# Patient Record
Sex: MALE | Race: OTHER | NOT HISPANIC OR LATINO | URBAN - METROPOLITAN AREA
[De-identification: names, ages, dates, MRNs, and addresses within clinical notes are randomized per-mention and may not be internally consistent; named-entity substitution may affect disease eponyms.]

---

## 2021-02-23 ENCOUNTER — INPATIENT (INPATIENT)
Facility: HOSPITAL | Age: 86
LOS: 8 days | Discharge: NURSING, REHAB OR LONG-TERM CARE | End: 2021-03-04
Attending: FAMILY MEDICINE | Admitting: FAMILY MEDICINE
Payer: MEDICARE

## 2021-02-23 VITALS
RESPIRATION RATE: 17 BRPM | TEMPERATURE: 98 F | WEIGHT: 154.98 LBS | HEIGHT: 67 IN | OXYGEN SATURATION: 98 % | SYSTOLIC BLOOD PRESSURE: 175 MMHG | HEART RATE: 69 BPM | DIASTOLIC BLOOD PRESSURE: 74 MMHG

## 2021-02-23 LAB
ALBUMIN SERPL ELPH-MCNC: 3.6 G/DL — SIGNIFICANT CHANGE UP (ref 3.5–5.2)
ALP SERPL-CCNC: 98 U/L — SIGNIFICANT CHANGE UP (ref 30–115)
ALT FLD-CCNC: 35 U/L — SIGNIFICANT CHANGE UP (ref 0–41)
ANION GAP SERPL CALC-SCNC: 15 MMOL/L — HIGH (ref 7–14)
APPEARANCE UR: ABNORMAL
AST SERPL-CCNC: 36 U/L — SIGNIFICANT CHANGE UP (ref 0–41)
BACTERIA # UR AUTO: ABNORMAL
BASOPHILS # BLD AUTO: 0.03 K/UL — SIGNIFICANT CHANGE UP (ref 0–0.2)
BASOPHILS NFR BLD AUTO: 0.2 % — SIGNIFICANT CHANGE UP (ref 0–1)
BILIRUB SERPL-MCNC: 0.4 MG/DL — SIGNIFICANT CHANGE UP (ref 0.2–1.2)
BILIRUB UR-MCNC: NEGATIVE — SIGNIFICANT CHANGE UP
BUN SERPL-MCNC: 85 MG/DL — CRITICAL HIGH (ref 10–20)
CALCIUM SERPL-MCNC: 8.6 MG/DL — SIGNIFICANT CHANGE UP (ref 8.5–10.1)
CHLORIDE SERPL-SCNC: 100 MMOL/L — SIGNIFICANT CHANGE UP (ref 98–110)
CO2 SERPL-SCNC: 27 MMOL/L — SIGNIFICANT CHANGE UP (ref 17–32)
COLOR SPEC: YELLOW — SIGNIFICANT CHANGE UP
CREAT SERPL-MCNC: 2.3 MG/DL — HIGH (ref 0.7–1.5)
DIFF PNL FLD: ABNORMAL
EOSINOPHIL # BLD AUTO: 0.03 K/UL — SIGNIFICANT CHANGE UP (ref 0–0.7)
EOSINOPHIL NFR BLD AUTO: 0.2 % — SIGNIFICANT CHANGE UP (ref 0–8)
EPI CELLS # UR: 0 /HPF — SIGNIFICANT CHANGE UP (ref 0–5)
GLUCOSE SERPL-MCNC: 115 MG/DL — HIGH (ref 70–99)
GLUCOSE UR QL: NEGATIVE — SIGNIFICANT CHANGE UP
HCT VFR BLD CALC: 38.5 % — LOW (ref 42–52)
HGB BLD-MCNC: 12.6 G/DL — LOW (ref 14–18)
HYALINE CASTS # UR AUTO: 2 /LPF — SIGNIFICANT CHANGE UP (ref 0–7)
IMM GRANULOCYTES NFR BLD AUTO: 1.2 % — HIGH (ref 0.1–0.3)
KETONES UR-MCNC: NEGATIVE — SIGNIFICANT CHANGE UP
LACTATE SERPL-SCNC: 1.1 MMOL/L — SIGNIFICANT CHANGE UP (ref 0.7–2)
LEUKOCYTE ESTERASE UR-ACNC: ABNORMAL
LYMPHOCYTES # BLD AUTO: 1.38 K/UL — SIGNIFICANT CHANGE UP (ref 1.2–3.4)
LYMPHOCYTES # BLD AUTO: 8.5 % — LOW (ref 20.5–51.1)
MCHC RBC-ENTMCNC: 31.1 PG — HIGH (ref 27–31)
MCHC RBC-ENTMCNC: 32.7 G/DL — SIGNIFICANT CHANGE UP (ref 32–37)
MCV RBC AUTO: 95.1 FL — HIGH (ref 80–94)
MONOCYTES # BLD AUTO: 0.17 K/UL — SIGNIFICANT CHANGE UP (ref 0.1–0.6)
MONOCYTES NFR BLD AUTO: 1 % — LOW (ref 1.7–9.3)
NEUTROPHILS # BLD AUTO: 14.45 K/UL — HIGH (ref 1.4–6.5)
NEUTROPHILS NFR BLD AUTO: 88.9 % — HIGH (ref 42.2–75.2)
NITRITE UR-MCNC: NEGATIVE — SIGNIFICANT CHANGE UP
NRBC # BLD: 0 /100 WBCS — SIGNIFICANT CHANGE UP (ref 0–0)
PH UR: 6 — SIGNIFICANT CHANGE UP (ref 5–8)
PLATELET # BLD AUTO: 218 K/UL — SIGNIFICANT CHANGE UP (ref 130–400)
POTASSIUM SERPL-MCNC: 3.8 MMOL/L — SIGNIFICANT CHANGE UP (ref 3.5–5)
POTASSIUM SERPL-SCNC: 3.8 MMOL/L — SIGNIFICANT CHANGE UP (ref 3.5–5)
PROT SERPL-MCNC: 7.2 G/DL — SIGNIFICANT CHANGE UP (ref 6–8)
PROT UR-MCNC: ABNORMAL
RAPID RVP RESULT: SIGNIFICANT CHANGE UP
RBC # BLD: 4.05 M/UL — LOW (ref 4.7–6.1)
RBC # FLD: 12 % — SIGNIFICANT CHANGE UP (ref 11.5–14.5)
RBC CASTS # UR COMP ASSIST: 114 /HPF — HIGH (ref 0–4)
SARS-COV-2 RNA SPEC QL NAA+PROBE: SIGNIFICANT CHANGE UP
SODIUM SERPL-SCNC: 142 MMOL/L — SIGNIFICANT CHANGE UP (ref 135–146)
SP GR SPEC: 1.02 — SIGNIFICANT CHANGE UP (ref 1.01–1.03)
UROBILINOGEN FLD QL: SIGNIFICANT CHANGE UP
WBC # BLD: 16.25 K/UL — HIGH (ref 4.8–10.8)
WBC # FLD AUTO: 16.25 K/UL — HIGH (ref 4.8–10.8)
WBC UR QL: 104 /HPF — HIGH (ref 0–5)

## 2021-02-23 PROCEDURE — 71045 X-RAY EXAM CHEST 1 VIEW: CPT | Mod: 26

## 2021-02-23 PROCEDURE — 76770 US EXAM ABDO BACK WALL COMP: CPT | Mod: 26

## 2021-02-23 PROCEDURE — 99285 EMERGENCY DEPT VISIT HI MDM: CPT

## 2021-02-23 PROCEDURE — 93010 ELECTROCARDIOGRAM REPORT: CPT

## 2021-02-23 RX ORDER — HEPARIN SODIUM 5000 [USP'U]/ML
5000 INJECTION INTRAVENOUS; SUBCUTANEOUS EVERY 8 HOURS
Refills: 0 | Status: DISCONTINUED | OUTPATIENT
Start: 2021-02-23 | End: 2021-03-04

## 2021-02-23 RX ORDER — FINASTERIDE 5 MG/1
1 TABLET, FILM COATED ORAL
Qty: 0 | Refills: 0 | DISCHARGE

## 2021-02-23 RX ORDER — CEFTRIAXONE 500 MG/1
1000 INJECTION, POWDER, FOR SOLUTION INTRAMUSCULAR; INTRAVENOUS EVERY 24 HOURS
Refills: 0 | Status: DISCONTINUED | OUTPATIENT
Start: 2021-02-23 | End: 2021-02-24

## 2021-02-23 RX ORDER — TAMSULOSIN HYDROCHLORIDE 0.4 MG/1
0.4 CAPSULE ORAL AT BEDTIME
Refills: 0 | Status: DISCONTINUED | OUTPATIENT
Start: 2021-02-23 | End: 2021-03-04

## 2021-02-23 RX ORDER — CEFTRIAXONE 500 MG/1
1000 INJECTION, POWDER, FOR SOLUTION INTRAMUSCULAR; INTRAVENOUS ONCE
Refills: 0 | Status: COMPLETED | OUTPATIENT
Start: 2021-02-23 | End: 2021-02-23

## 2021-02-23 RX ORDER — SODIUM CHLORIDE 9 MG/ML
1000 INJECTION, SOLUTION INTRAVENOUS ONCE
Refills: 0 | Status: COMPLETED | OUTPATIENT
Start: 2021-02-23 | End: 2021-02-23

## 2021-02-23 RX ORDER — SODIUM CHLORIDE 9 MG/ML
1000 INJECTION, SOLUTION INTRAVENOUS
Refills: 0 | Status: DISCONTINUED | OUTPATIENT
Start: 2021-02-23 | End: 2021-02-26

## 2021-02-23 RX ORDER — CHLORHEXIDINE GLUCONATE 213 G/1000ML
1 SOLUTION TOPICAL
Refills: 0 | Status: DISCONTINUED | OUTPATIENT
Start: 2021-02-23 | End: 2021-03-04

## 2021-02-23 RX ORDER — FINASTERIDE 5 MG/1
5 TABLET, FILM COATED ORAL DAILY
Refills: 0 | Status: DISCONTINUED | OUTPATIENT
Start: 2021-02-23 | End: 2021-03-04

## 2021-02-23 RX ORDER — ACETAMINOPHEN 500 MG
650 TABLET ORAL EVERY 6 HOURS
Refills: 0 | Status: DISCONTINUED | OUTPATIENT
Start: 2021-02-23 | End: 2021-03-04

## 2021-02-23 RX ADMIN — SODIUM CHLORIDE 75 MILLILITER(S): 9 INJECTION, SOLUTION INTRAVENOUS at 16:28

## 2021-02-23 RX ADMIN — FINASTERIDE 5 MILLIGRAM(S): 5 TABLET, FILM COATED ORAL at 16:27

## 2021-02-23 RX ADMIN — TAMSULOSIN HYDROCHLORIDE 0.4 MILLIGRAM(S): 0.4 CAPSULE ORAL at 21:41

## 2021-02-23 RX ADMIN — CEFTRIAXONE 1000 MILLIGRAM(S): 500 INJECTION, POWDER, FOR SOLUTION INTRAMUSCULAR; INTRAVENOUS at 12:52

## 2021-02-23 RX ADMIN — SODIUM CHLORIDE 75 MILLILITER(S): 9 INJECTION, SOLUTION INTRAVENOUS at 21:52

## 2021-02-23 RX ADMIN — CEFTRIAXONE 100 MILLIGRAM(S): 500 INJECTION, POWDER, FOR SOLUTION INTRAMUSCULAR; INTRAVENOUS at 12:51

## 2021-02-23 RX ADMIN — SODIUM CHLORIDE 1000 MILLILITER(S): 9 INJECTION, SOLUTION INTRAVENOUS at 13:33

## 2021-02-23 RX ADMIN — HEPARIN SODIUM 5000 UNIT(S): 5000 INJECTION INTRAVENOUS; SUBCUTANEOUS at 21:42

## 2021-02-23 RX ADMIN — SODIUM CHLORIDE 1000 MILLILITER(S): 9 INJECTION, SOLUTION INTRAVENOUS at 14:53

## 2021-02-23 RX ADMIN — Medication 650 MILLIGRAM(S): at 16:27

## 2021-02-23 NOTE — ED ADULT TRIAGE NOTE - CHIEF COMPLAINT QUOTE
patient sent in from home for AMS and fever x 1 week, patient currently alert and oriented x3 patient sent in from home for AMS, fever, and vomiting x 1 week, patient currently alert and oriented x3

## 2021-02-23 NOTE — H&P ADULT - NSHPPHYSICALEXAM_GEN_ALL_CORE
Vital Signs Last 24 Hrs  T(C): 38.7 (23 Feb 2021 15:21), Max: 38.7 (23 Feb 2021 15:21)  T(F): 101.7 (23 Feb 2021 15:21), Max: 101.7 (23 Feb 2021 15:21)  HR: 118 (23 Feb 2021 15:21) (69 - 118)  BP: 117/56 (23 Feb 2021 15:21) (117/56 - 175/74)  RR: 16 (23 Feb 2021 15:21) (16 - 17)  SpO2: 97% (23 Feb 2021 15:21) (97% - 98%)        GENERAL: NAD, lying in bed comfortably  HEAD:  Atraumatic, Normocephalic  EYES: EOMI, PERRLA, conjunctiva and sclera clear  ENT: Dry mucous membranes  CHEST/LUNG: Clear to auscultation bilaterally; No rales, rhonchi, wheezing, or rubs. Unlabored respirations  HEART: Regular rate and rhythm; No murmurs, rubs, or gallops  ABDOMEN: Bowel sounds present; Soft, Nontender  EXTREMITIES: 2+ Peripheral Pulses, brisk capillary refill. No clubbing, cyanosis, or edema  NERVOUS SYSTEM:  Alert & Oriented X2, speech clear. No deficits

## 2021-02-23 NOTE — H&P ADULT - ATTENDING COMMENTS
I saw and evaluated the patient. I have reviewed and agree with the findings and plan of care as documented above in the resident’s note (unless indicated differently below). Any necessary changes were made in the body of the text. I saw and evaluated the patient. I have reviewed and agree with the findings and plan of care as documented above in the resident’s note (unless indicated differently below). Any necessary changes were made in the body of the text.    CC: fever    HPI:  89 yo M pt coming in with fever. Assoc w/ confusion (acute on chronic x 3 mths but severe over the past week), dysuria, generalized weakness and decreased PO intake w/ wt loss. Onset of symptoms: x 1 week. Course: persistent and progressively worsening. Duration: waxes and wanes. Intensity: moderate to severe. Precipitating factors: hx of UTI and BPH. Alleviating factors: none. Aggravating factors: none. Pt denied other concerns or complaints at the time of this evaluation.    ROS:  Constitutional: +fever; +generalized weakness  Eyes: no new vision changes  ENT: no dysphagia; no odynophagia  CVS: no chest pain  Resp: no SOB; no coughing  GI: no diarrhea; no abd pain; +anorexia  : +dysuria; +hesitancy; no hematuria  MSK: +generalized aches/pains  Skin: no rashes; no ulcers  Neuro: no headache  All other systems reviewed and are negative    PMHx, home medications, SurHx, FHx and Social history as above in the corresponding sections of the note - reviewed and edited where appropriate    Exam:  Vitals: BP = 130/57; P = 96; T = 96.5; RR = 18; SpO2 > 95 on room air  General: appears stated age; cooperative  Eyes: anicteric sclera; moist conjunctiva; PERRL; EOMI  HENT: NC/AT; clear oropharynx w/ moist mucous membranes; nL hard/soft palate  Neck: supple w/ FROM; trachea midline  Lungs: no tachypnea, accessory muscle use, wheezing, rhonci or rales  CVS: RRR; S1 and S2 w/o MRGs  Abd: BS+; soft; non-tender to palpation x 4; no masses or HSM  Ext: no peripheral edema; pulses palpable distally  Skin: normal temp, turgor and texture; no rashes, ulcers or nodules  Neuro: CN II-XII intact; able to move all extremities  Psych: appropriate affect; alert and oriented to person, place and situation    Labs reviewed and are significant for WBC 16.25, H&H 12.6/38.5, MCV 95.1, BUN/Cr 85/2.3, AG 15, bicarb 27  U/A w/ 104 WBC's, many bacteria, 114 RBC's, large LE    Microbiology reviewed: blood cultures positive for E. coli    Imaging reviewed and shows:   U/S kidneys w/ no hydronephrosis  CXR w/ no acute cardiopulmonary process    EKG reviewed and shows: NSR; RBBB    Assessment:  (1) Gram negative bacteremia 2/2 E. coli UTI  (2) Stage 2 NIYA on CKD 3 2/2 infectious process  (3) Borderline macrocytic anemia: r/o B12, folate deficiency  --- No active GI bleed at the time of this eval  --- Blood on wiping could be hemorrhoidal  (4) BPH - chronic (increases risk of UTI)  (5) Physical deconditioning    Plan:  (1) F/u culture results and sensitivities:  --- Prelim results show E. coli  --- Prior Cx's (from 2017 on) grew E. coli sensitive to CTX  (2) Antibiotics: ceftriaxone 2 grams daily x 10 days  --- Ceftriaxone is appropriate coverage given above  --- Plus patient is improving clinically w/ treatment  (3) Gentle hydration  (4) Monitor UOP; monitor BUN/Cr; avoid nephrotoxins; dose renally  (5) Check B12 and folate level  (6) Clinical monitoring for GI bleed  (7) Agree w/ tamsulosin and finasteride  (8) Need PT eval and Physiatry eval - possible placement  (9) Disposition: anticipate need for 48-72 hrs of in-pt care  --- If remains afebrile and continues to improve, can switch to PO  --- Antibiotics to complete 10 day course  --- Possible need for placement     Full code

## 2021-02-23 NOTE — H&P ADULT - NSHPLABSRESULTS_GEN_ALL_CORE
LABS:  cret                        12.6   16.25 )-----------( 218      ( 23 Feb 2021 12:16 )             38.5     02-23    142  |  100  |  85<HH>  ----------------------------<  115<H>  3.8   |  27  |  2.3<H>    Ca    8.6      23 Feb 2021 12:16    TPro  7.2  /  Alb  3.6  /  TBili  0.4  /  DBili  x   /  AST  36  /  ALT  35  /  AlkPhos  98  02-23

## 2021-02-23 NOTE — ED PROVIDER NOTE - CLINICAL SUMMARY MEDICAL DECISION MAKING FREE TEXT BOX
89yo M presents for fever, AMS. Found to have UTI. Vitals stable on arrival. Leukocytosis. Suspect NIYA though no baseline creatinine. Given fluids and ABx. Will admit.

## 2021-02-23 NOTE — ED PROVIDER NOTE - NS ED ROS FT
“You can access the Follow[employer] Patient Portal, offered by [facility], by registering with the following website: [email or web address]” Constitutional: (+) fever, (+) fatigue  Eyes/ENT: (-) blurry vision, (-) epistaxis  Cardiovascular: (-) chest pain, (-) syncope  Respiratory: (-) cough, (-) shortness of breath  Gastrointestinal: (+) vomiting, (-) diarrhea  : (-) dysuria, (-) hematuria  Musculoskeletal: (-) neck pain, (-) back pain, (-) joint pain  Integumentary: (-) rash, (-) edema  Neurological: (-) headache, (-) altered mental status  Allergic/Immunologic: (-) pruritus

## 2021-02-23 NOTE — ED ADULT NURSE NOTE - CHIEF COMPLAINT QUOTE
patient sent in from home for AMS, fever, and vomiting x 1 week, patient currently alert and oriented x3

## 2021-02-23 NOTE — ED PROVIDER NOTE - CARE PLAN
Principal Discharge DX:	UTI (urinary tract infection)  Secondary Diagnosis:	NIYA (acute kidney injury)

## 2021-02-23 NOTE — ED PROVIDER NOTE - ATTENDING CONTRIBUTION TO CARE
89yo M with PMHx BPH, presents for fever. Patient is AAOx3 but a relatively poor historian. States feeling unwell but unable to give specific details. Additional history obtained from wife (Travis, [phone]), and daughter (Rajeev, [phone]).    Wife states pt has been having 4 days of fever, vomiting, increased confusion.     Daughter states for past 3 months patient has had progressive mental deterioration, increased confusion, suspects underlying undiagnosed dementia but remains relatively functional. 1 week ago she noticed a sharp decline in mental status and change in behavior, states patient is removing clothes and walking around house naked, then became unable to walk 2/2 generalized weakness. Additionally notes pt having poor PO intake with unintentional weight loss over past month. She also notes that she thought patient had rectal bleeding last few days (note that on my exam, pt has no BRBPR, there is pink tinged urine in diaper). Denies CP, SOB, cough, diarrhea.    Vital signs reviewed  GENERAL: Patient nontoxic appearing, NAD. Pink tinged urine in diaper with foul odor  HEAD: NCAT  EYES: Anicteric  ENT: Dry MM  RESPIRATORY: Normal respiratory effort. CTA B/L. No wheezing, rales, rhonchi  CARDIOVASCULAR: Regular rate and rhythm  ABDOMEN: Soft. Nondistended. Nontender.   MUSCULOSKELETAL/EXTREMITIES: Brisk cap refill. Equal radial pulses. No leg edema.  RECTAL: Exam performed with Dr. Rocha. Light brown stool, no melena, no BRBPR.   SKIN:  Warm and dry  NEURO: AAOx3. Answers basic questions. Moving all extremities.

## 2021-02-23 NOTE — ED PROVIDER NOTE - PHYSICAL EXAMINATION
CONST: NAD  EYES: Sclera and conjunctiva clear.   ENT: No nasal discharge. Oropharynx normal appearing, no erythema or exudates. No abscess or swelling. Uvula midline.   NECK: Non-tender, no meningeal signs. normal ROM. supple   CARD: S1 S2; No jvd  RESP: Equal BS B/L, No wheezes, rhonchi or rales. No distress  GI: Soft, non-tender, non-distended. no cva tenderness. normal BS  MS: Normal ROM in all extremities. pulses 2 +. no calf tenderness or swelling  SKIN: Warm, dry, no acute rashes. Good turgor  NEURO: A&Ox3, No focal deficits. Strength 5/5 with no sensory deficits. Finger to nose intact. Negative pronator drift

## 2021-02-23 NOTE — ED PROVIDER NOTE - PROGRESS NOTE DETAILS
mark Gallagher I spoke with patient's wife, Travis ([phone]), and daughter, Rajeev ([phone]). Additional history obtained, see attestation.

## 2021-02-23 NOTE — ED PROVIDER NOTE - OBJECTIVE STATEMENT
89y M PMD BPH presents for eval of fever. As per daughter pt increased episodes of confusion x3mo with associated fatigue. Pt developed fever this week with associated n/v, relieved with Tylenol, no aggravating factors. Denies ha, cp, sob, weakness, numbness, dysuria

## 2021-02-23 NOTE — H&P ADULT - HISTORY OF PRESENT ILLNESS
89y M PMD BPH presents for eval of fever. Pt developed fever this week with associated n/v, relieved with Tylenol, no aggravating factors. Patient reports weakness, fatigue and dysuria and poor PO intake for last few days. Also some chronic difficulty to urinate. A&Ox2 which per daughter is baseline.   Per daughter he lost 30-40 pounds in last few months and has been bleeding rectally.  He denies other symptoms, including chest pain, SOB, abdominal pain, back pain.    ED course: T(F): 98.3, HR: 69, BP: 175/74. SpO2: 98% on RA. WBC 16.25, Cr 2.3, 104 WBCs in urine.

## 2021-02-23 NOTE — H&P ADULT - ASSESSMENT
89y M PMD BPH admitted for UTI and NIYA.      # UTI dysuria, leukocytosis, positive UA  - Blood/urine cultures pending  - Obtain kidney u/s in setting of NIYA and BPH  - Continue ceftriaxone    # NIYA on CKD 3 likely 2/2 hypovolemia  - Cr 2.3. [facility]  - Obtain urine lytes, protein/cr ration  - S/p 1L in ED  - Light hydration    # H/o rectal bleed?  - Last colonoscopy 5-10 years ago per daughter  - Vitals, Hg stable  - No bleeding since presentation to ED  - In setting of weight loss, concerning for CRC  - Monitor Hg  - Obtain occult blood, if positive consider consulting GI    # BPH  - Continue finasteride  - Start flomax (patient reports difficulty urinating)      DVT PPX: Heparin  Diet: DASH  Dispo: From home. Social workr consult (family having difficulty caring for him) 89y M PMD BPH admitted for UTI and NIYA.      # UTI dysuria, leukocytosis, positive UA  - Blood/urine cultures pending  - Obtain kidney u/s in setting of NIYA and BPH  - Continue ceftriaxone    # NIYA on CKD 3 likely 2/2 hypovolemia  - Cr 2.3. [facility]  - Obtain urine lytes, protein/cr ration  - S/p 1L in ED  - Light hydration    # H/o rectal bleed?  - Last colonoscopy 5-10 years ago per daughter  - Vitals, Hg stable  - No bleeding since presentation to ED  - In setting of weight loss, concerning for CRC  - Monitor Hg  - Obtain occult blood    # BPH  - Continue finasteride  - Start flomax (patient reports difficulty urinating)      DVT PPX: Heparin  Diet: DASH  Dispo: From home. Social workr consult (family having difficulty caring for him)

## 2021-02-23 NOTE — ED ADULT NURSE NOTE - OBJECTIVE STATEMENT
Pt presented to ED c/o med eval. As per pt, pt c/o intermittent fevers, n/v, and AMS. Pt A&Ox3 in ED. BA in place. Fall precautions initiated.

## 2021-02-24 LAB
ANION GAP SERPL CALC-SCNC: 26 MMOL/L — HIGH (ref 7–14)
BASOPHILS # BLD AUTO: 0 K/UL — SIGNIFICANT CHANGE UP (ref 0–0.2)
BASOPHILS # BLD AUTO: 0.02 K/UL — SIGNIFICANT CHANGE UP (ref 0–0.2)
BASOPHILS NFR BLD AUTO: 0 % — SIGNIFICANT CHANGE UP (ref 0–1)
BASOPHILS NFR BLD AUTO: 0 % — SIGNIFICANT CHANGE UP (ref 0–1)
BLD GP AB SCN SERPL QL: SIGNIFICANT CHANGE UP
BUN SERPL-MCNC: 101 MG/DL — CRITICAL HIGH (ref 10–20)
CALCIUM SERPL-MCNC: 8.1 MG/DL — LOW (ref 8.5–10.1)
CHLORIDE SERPL-SCNC: 100 MMOL/L — SIGNIFICANT CHANGE UP (ref 98–110)
CO2 SERPL-SCNC: 21 MMOL/L — SIGNIFICANT CHANGE UP (ref 17–32)
CREAT SERPL-MCNC: 3.6 MG/DL — HIGH (ref 0.7–1.5)
E COLI DNA BLD POS QL NAA+NON-PROBE: SIGNIFICANT CHANGE UP
EOSINOPHIL # BLD AUTO: 0 K/UL — SIGNIFICANT CHANGE UP (ref 0–0.7)
EOSINOPHIL # BLD AUTO: 0.01 K/UL — SIGNIFICANT CHANGE UP (ref 0–0.7)
EOSINOPHIL NFR BLD AUTO: 0 % — SIGNIFICANT CHANGE UP (ref 0–8)
EOSINOPHIL NFR BLD AUTO: 0 % — SIGNIFICANT CHANGE UP (ref 0–8)
GIANT PLATELETS BLD QL SMEAR: PRESENT — SIGNIFICANT CHANGE UP
GLUCOSE SERPL-MCNC: 100 MG/DL — HIGH (ref 70–99)
GRAM STN FLD: SIGNIFICANT CHANGE UP
HCT VFR BLD CALC: 33 % — LOW (ref 42–52)
HCT VFR BLD CALC: 36.7 % — LOW (ref 42–52)
HGB BLD-MCNC: 10.7 G/DL — LOW (ref 14–18)
HGB BLD-MCNC: 11.9 G/DL — LOW (ref 14–18)
IMM GRANULOCYTES NFR BLD AUTO: 13.3 % — HIGH (ref 0.1–0.3)
LACTATE SERPL-SCNC: 1.3 MMOL/L — SIGNIFICANT CHANGE UP (ref 0.7–2)
LYMPHOCYTES # BLD AUTO: 0.51 K/UL — LOW (ref 1.2–3.4)
LYMPHOCYTES # BLD AUTO: 0.9 % — LOW (ref 20.5–51.1)
LYMPHOCYTES # BLD AUTO: 1.32 K/UL — SIGNIFICANT CHANGE UP (ref 1.2–3.4)
LYMPHOCYTES # BLD AUTO: 2.4 % — LOW (ref 20.5–51.1)
MANUAL SMEAR VERIFICATION: SIGNIFICANT CHANGE UP
MCHC RBC-ENTMCNC: 31.2 PG — HIGH (ref 27–31)
MCHC RBC-ENTMCNC: 31.6 PG — HIGH (ref 27–31)
MCHC RBC-ENTMCNC: 32.4 G/DL — SIGNIFICANT CHANGE UP (ref 32–37)
MCHC RBC-ENTMCNC: 32.4 G/DL — SIGNIFICANT CHANGE UP (ref 32–37)
MCV RBC AUTO: 96.2 FL — HIGH (ref 80–94)
MCV RBC AUTO: 97.6 FL — HIGH (ref 80–94)
METAMYELOCYTES # FLD: 2.5 % — HIGH (ref 0–0)
METHOD TYPE: SIGNIFICANT CHANGE UP
MONOCYTES # BLD AUTO: 0.51 K/UL — SIGNIFICANT CHANGE UP (ref 0.1–0.6)
MONOCYTES # BLD AUTO: 1.66 K/UL — HIGH (ref 0.1–0.6)
MONOCYTES NFR BLD AUTO: 0.9 % — LOW (ref 1.7–9.3)
MONOCYTES NFR BLD AUTO: 3 % — SIGNIFICANT CHANGE UP (ref 1.7–9.3)
MYELOCYTES NFR BLD: 0.8 % — HIGH (ref 0–0)
NEUTROPHILS # BLD AUTO: 44.72 K/UL — HIGH (ref 1.4–6.5)
NEUTROPHILS # BLD AUTO: 53.68 K/UL — HIGH (ref 1.4–6.5)
NEUTROPHILS NFR BLD AUTO: 81.3 % — HIGH (ref 42.2–75.2)
NEUTROPHILS NFR BLD AUTO: 89.8 % — HIGH (ref 42.2–75.2)
NEUTS BAND # BLD: 5.1 % — SIGNIFICANT CHANGE UP (ref 0–6)
NRBC # BLD: 0 /100 WBCS — SIGNIFICANT CHANGE UP (ref 0–0)
PLAT MORPH BLD: NORMAL — SIGNIFICANT CHANGE UP
PLATELET # BLD AUTO: 144 K/UL — SIGNIFICANT CHANGE UP (ref 130–400)
PLATELET # BLD AUTO: 160 K/UL — SIGNIFICANT CHANGE UP (ref 130–400)
POTASSIUM SERPL-MCNC: 3.8 MMOL/L — SIGNIFICANT CHANGE UP (ref 3.5–5)
POTASSIUM SERPL-SCNC: 3.8 MMOL/L — SIGNIFICANT CHANGE UP (ref 3.5–5)
RBC # BLD: 3.43 M/UL — LOW (ref 4.7–6.1)
RBC # BLD: 3.76 M/UL — LOW (ref 4.7–6.1)
RBC # FLD: 12.2 % — SIGNIFICANT CHANGE UP (ref 11.5–14.5)
RBC # FLD: 12.3 % — SIGNIFICANT CHANGE UP (ref 11.5–14.5)
RBC BLD AUTO: NORMAL — SIGNIFICANT CHANGE UP
SODIUM SERPL-SCNC: 147 MMOL/L — HIGH (ref 135–146)
SPECIMEN SOURCE: SIGNIFICANT CHANGE UP
SPECIMEN SOURCE: SIGNIFICANT CHANGE UP
WBC # BLD: 55.03 K/UL — CRITICAL HIGH (ref 4.8–10.8)
WBC # BLD: 56.57 K/UL — CRITICAL HIGH (ref 4.8–10.8)
WBC # FLD AUTO: 55.03 K/UL — CRITICAL HIGH (ref 4.8–10.8)
WBC # FLD AUTO: 56.57 K/UL — CRITICAL HIGH (ref 4.8–10.8)

## 2021-02-24 PROCEDURE — 99223 1ST HOSP IP/OBS HIGH 75: CPT

## 2021-02-24 RX ORDER — MEROPENEM 1 G/30ML
500 INJECTION INTRAVENOUS EVERY 12 HOURS
Refills: 0 | Status: DISCONTINUED | OUTPATIENT
Start: 2021-02-24 | End: 2021-02-25

## 2021-02-24 RX ORDER — CEFTRIAXONE 500 MG/1
1000 INJECTION, POWDER, FOR SOLUTION INTRAMUSCULAR; INTRAVENOUS ONCE
Refills: 0 | Status: COMPLETED | OUTPATIENT
Start: 2021-02-24 | End: 2021-02-24

## 2021-02-24 RX ADMIN — SODIUM CHLORIDE 75 MILLILITER(S): 9 INJECTION, SOLUTION INTRAVENOUS at 06:50

## 2021-02-24 RX ADMIN — MEROPENEM 100 MILLIGRAM(S): 1 INJECTION INTRAVENOUS at 22:40

## 2021-02-24 RX ADMIN — HEPARIN SODIUM 5000 UNIT(S): 5000 INJECTION INTRAVENOUS; SUBCUTANEOUS at 21:53

## 2021-02-24 RX ADMIN — HEPARIN SODIUM 5000 UNIT(S): 5000 INJECTION INTRAVENOUS; SUBCUTANEOUS at 12:24

## 2021-02-24 RX ADMIN — CEFTRIAXONE 100 MILLIGRAM(S): 500 INJECTION, POWDER, FOR SOLUTION INTRAMUSCULAR; INTRAVENOUS at 10:58

## 2021-02-24 RX ADMIN — CHLORHEXIDINE GLUCONATE 1 APPLICATION(S): 213 SOLUTION TOPICAL at 06:50

## 2021-02-24 RX ADMIN — FINASTERIDE 5 MILLIGRAM(S): 5 TABLET, FILM COATED ORAL at 12:24

## 2021-02-24 RX ADMIN — TAMSULOSIN HYDROCHLORIDE 0.4 MILLIGRAM(S): 0.4 CAPSULE ORAL at 21:53

## 2021-02-24 RX ADMIN — CEFTRIAXONE 100 MILLIGRAM(S): 500 INJECTION, POWDER, FOR SOLUTION INTRAMUSCULAR; INTRAVENOUS at 06:49

## 2021-02-24 RX ADMIN — HEPARIN SODIUM 5000 UNIT(S): 5000 INJECTION INTRAVENOUS; SUBCUTANEOUS at 06:50

## 2021-02-24 NOTE — PROGRESS NOTE ADULT - SUBJECTIVE AND OBJECTIVE BOX
HIEN, MIGUEL ÁNGEL  89y, Male  Allergy: Allergy Status Unknown    Hospital Day: 1d    Patient seen and examined earlier today.  Feeling well , no complaints.     PMH/PSH:  PAST MEDICAL & SURGICAL HISTORY:  BPH (benign prostatic hyperplasia)    No significant past surgical history        LAST 24-Hr EVENTS:    VITALS:  T(F): 96.9 (21 @ 14:35), Max: 98.3 (21 @ 22:01)  HR: 78 (21 @ 14:35)  BP: 134/64 (21 @ 14:35) (112/55 - 134/64)  RR: 18 (21 @ 14:35)  SpO2: 98% (21 @ 22:01)        TESTS & MEASUREMENTS:  Weight (Kg): 55.2 (21 @ 00:51)  BMI (kg/m2): 19.1 ()    21 @ 07:01  -  21 @ 07:00  --------------------------------------------------------  IN: 375 mL / OUT: 0 mL / NET: 375 mL    21 @ 07:01  -  21 @ 17:07  --------------------------------------------------------  IN: 800 mL / OUT: 0 mL / NET: 800 mL                            11.9   56.57 )-----------( 160      ( 2021 05:25 )             36.7           147<H>  |  100  |  101<HH>  ----------------------------<  100<H>  3.8   |  21  |  3.6<H>    Ca    8.1<L>      2021 05:25    TPro  7.2  /  Alb  3.6  /  TBili  0.4  /  DBili  x   /  AST  36  /  ALT  35  /  AlkPhos  98      LIVER FUNCTIONS - ( 2021 12:16 )  Alb: 3.6 g/dL / Pro: 7.2 g/dL / ALK PHOS: 98 U/L / ALT: 35 U/L / AST: 36 U/L / GGT: x                 Culture - Blood (collected 21 @ 12:16)  Source: .Blood Blood-Peripheral  Gram Stain (21 @ 09:04):    Growth in aerobic bottle: Gram Negative Rods    Growth in anaerobic bottle: Gram Negative Rods  Preliminary Report (21 @ 09:05):    Growth in aerobic bottle: Gram Negative Rods    Growth in anaerobic bottle: Gram Negative Rods    Culture - Blood (collected 21 @ 12:08)  Source: .Blood Blood-Peripheral  Gram Stain (21 @ 06:14):    Growth in aerobic and anaerobic bottles: Gram Negative Rods  Preliminary Report (21 @ 06:14):    Growth in aerobic and anaerobic bottles: Gram Negative Rods    ***Blood Panel PCR results on this specimen are available    approximately 3 hours after the Gram stain result.***    Gram stain, PCR, and/or culture results may not always    correspond due todifference in methodologies.    ************************************************************    This PCR assay was performed by multiplex PCR. This    Assay tests for 66 bacterial and resistance gene targets.    Please refer to the [facility] testdirectory    at [email or web address] for details.  Organism: Blood Culture PCR (21 @ 08:07)  Organism: Blood Culture PCR (21 @ 08:07)      -  Escherichia coli: Detec      Method Type: PCR      Urinalysis Basic - ( 2021 12:16 )    Color: Yellow / Appearance: Slightly Turbid / S.018 / pH: x  Gluc: x / Ketone: Negative  / Bili: Negative / Urobili: <2 mg/dL   Blood: x / Protein: 30 mg/dL / Nitrite: Negative   Leuk Esterase: Large / RBC: 114 /HPF /  /HPF   Sq Epi: x / Non Sq Epi: 0 /HPF / Bacteria: Many                  RADIOLOGY, ECG, & ADDITIONAL TESTS:  12 Lead ECG:   Ventricular Rate 65 BPM    Atrial Rate 65 BPM    P-R Interval 132 ms    QRS Duration 128 ms    Q-T Interval 448 ms    QTC Calculation(Bazett) 465 ms    P Axis 73 degrees    R Axis 84 degrees    T Axis 74 degrees    Diagnosis Line Normal sinus rhythm  Right bundle branch block  Abnormal ECG    Confirmed by YONG MAHMOOD MD ([id number]) on 2021 1:03:19 PM (21 @ 11:57)      RECENT DIAGNOSTIC ORDERS:  Culture - Blood: 11:00  Specimen Source: Blood (21 @ 09:13)  Culture - Blood: 11:00  Specimen Source: Blood (21 @ 09:13)      MEDICATIONS:  MEDICATIONS  (STANDING):  chlorhexidine 4% Liquid 1 Application(s) Topical <User Schedule>  finasteride 5 milliGRAM(s) Oral daily  heparin   Injectable 5000 Unit(s) SubCutaneous every 8 hours  lactated ringers. 1000 milliLiter(s) (75 mL/Hr) IV Continuous <Continuous>  tamsulosin 0.4 milliGRAM(s) Oral at bedtime    MEDICATIONS  (PRN):  acetaminophen  Suppository .. 650 milliGRAM(s) Rectal every 6 hours PRN Temp greater or equal to 38C (100.4F), Mild Pain (1 - 3)      HOME MEDICATIONS:  finasteride 1 mg oral tablet ()      PHYSICAL EXAM:  GENERAL: alert , unable to assess orientation   HNENT: NCAT PERRLA    NECK: No LAD/swelling  CHEST/LUNG:  CTAB no wheezes/rales/ronchi  HEART: RRR, No murmurs  ABDOMEN: Soft, NT, ND,  Bowel sounds present  EXTREMITIES:  Warm well perfused, no edema

## 2021-02-24 NOTE — CHART NOTE - NSCHARTNOTEFT_GEN_A_CORE
Maria Del Rosario - daughter [phone]    Spoke with daughter who wanted to follow up and ask about father's condition. Maria Del Rosario mentions she has concern about patient's deteriorating mental status, notices patient has had changes in mood and "is not himself." She explains he has been having poor PO intake and rapid weight loss of 20-30 pounds within 2 months and is concerned. She informed me that patient had 3 bowel movement where she noted bright red on tissue after she wiped him after bowel movement no black tarry stool or gross blood noted. Explained patient's H/H has been stable but we would be monitoring. Answered patient's questions and concerns to her satisfaction.

## 2021-02-24 NOTE — PROGRESS NOTE ADULT - ASSESSMENT
89y M PMHx of BPH admitted for UTI and NIYA.    # Sepsis 2/2 UTI   # E.Coli Bacteremia   - dysuria, leukocytosis, positive UA on adm   - Renal sono w/o hydro   - Blood Cx w/ E.Coli 2/2 cultures   - Continue Ceftriaxone  - repeat blood cultures pending     #Severe Leukocytosis   - unclear if this is a lab error   - submitted for pathology review   - CBC w/ smear this evening and in am   - if accurate, will need stat Hematology consultation     # NIYA on CKD 3  - hypovolemia vs septic ATN   - Cr 2.3--> 3.6 today, Baseline Cr 1.2 per [facility]  - Obtain urine lytes, protein/cr ratio  - S/p 1L in ED  - Light hydration  - Nephrology consult     # c/f rectal bleed  - Last colonoscopy 5-10 years ago per daughter  - Vitals, Hg stable  - No bleeding since presentation to ED  - monitor closely     #Macrocytic Anemia  - will send B 12 and Folate levels     # BPH  - Continue finasteride  - Start flomax      DVT PPX: Heparin    FULL CODE    Family: No listed emergency contact, attempting to find point of contact, to update family.     Nikole Thurston, DO        89y M PMHx of BPH admitted for UTI and NIYA.    # Sepsis 2/2 UTI   # E.Coli Bacteremia   - dysuria, leukocytosis, positive UA on adm   - Renal sono w/o hydro   - Blood Cx w/ E.Coli 2/2 cultures   - Continue Ceftriaxone  - repeat blood cultures pending     #Severe Leukocytosis   - unclear if this is a lab error   - submitted for pathology review   - CBC w/ smear this evening and in am   - if accurate, will need stat Hematology consultation     # NIYA on CKD 3  - hypovolemia vs septic ATN   - Cr 2.3--> 3.6 today, Baseline Cr 1.2 per [facility]  - Obtain urine lytes, protein/cr ratio  - S/p 1L in ED  - Light hydration  - Nephrology consult     # c/f rectal bleed  - Last colonoscopy 5-10 years ago per daughter  - Vitals, Hg stable  - No bleeding since presentation to ED  - monitor closely     #Macrocytic Anemia  - will send B 12 and Folate levels     # BPH  - Continue finasteride  - Start flomax    #Poor PO intake       DVT PPX: Heparin    DNR/DNI    Family: Spoke at length w/ Rajeev ( Daughter ) [phone] about clinical condition, concern for poor prognosis, pt DNR/DNI         Nikole Thurston DO

## 2021-02-24 NOTE — PHYSICAL THERAPY INITIAL EVALUATION ADULT - SPECIFY REASON(S)
159 pm Spoke to PA in unit regarding activity orders. PT waiting for out of bed / ambulation orders. Will cont to f/u.

## 2021-02-25 PROBLEM — N40.0 BENIGN PROSTATIC HYPERPLASIA WITHOUT LOWER URINARY TRACT SYMPTOMS: Chronic | Status: ACTIVE | Noted: 2021-02-23

## 2021-02-25 PROBLEM — Z00.00 ENCOUNTER FOR PREVENTIVE HEALTH EXAMINATION: Status: ACTIVE | Noted: 2021-02-25

## 2021-02-25 LAB
ALBUMIN SERPL ELPH-MCNC: 3.1 G/DL — LOW (ref 3.5–5.2)
ALP SERPL-CCNC: 139 U/L — HIGH (ref 30–115)
ALT FLD-CCNC: 56 U/L — HIGH (ref 0–41)
ANION GAP SERPL CALC-SCNC: 20 MMOL/L — HIGH (ref 7–14)
AST SERPL-CCNC: 117 U/L — HIGH (ref 0–41)
BASOPHILS # BLD AUTO: 0.16 K/UL — SIGNIFICANT CHANGE UP (ref 0–0.2)
BASOPHILS NFR BLD AUTO: 0.4 % — SIGNIFICANT CHANGE UP (ref 0–1)
BILIRUB SERPL-MCNC: 0.4 MG/DL — SIGNIFICANT CHANGE UP (ref 0.2–1.2)
BUN SERPL-MCNC: 97 MG/DL — CRITICAL HIGH (ref 10–20)
CALCIUM SERPL-MCNC: 8.4 MG/DL — LOW (ref 8.5–10.1)
CHLORIDE SERPL-SCNC: 102 MMOL/L — SIGNIFICANT CHANGE UP (ref 98–110)
CO2 SERPL-SCNC: 23 MMOL/L — SIGNIFICANT CHANGE UP (ref 17–32)
CREAT ?TM UR-MCNC: 43 MG/DL — SIGNIFICANT CHANGE UP
CREAT SERPL-MCNC: 2.9 MG/DL — HIGH (ref 0.7–1.5)
EOSINOPHIL # BLD AUTO: 0.03 K/UL — SIGNIFICANT CHANGE UP (ref 0–0.7)
EOSINOPHIL NFR BLD AUTO: 0.1 % — SIGNIFICANT CHANGE UP (ref 0–8)
GLUCOSE SERPL-MCNC: 72 MG/DL — SIGNIFICANT CHANGE UP (ref 70–99)
HCT VFR BLD CALC: 36.2 % — LOW (ref 42–52)
HGB BLD-MCNC: 11.8 G/DL — LOW (ref 14–18)
IMM GRANULOCYTES NFR BLD AUTO: 10.1 % — HIGH (ref 0.1–0.3)
LYMPHOCYTES # BLD AUTO: 1.51 K/UL — SIGNIFICANT CHANGE UP (ref 1.2–3.4)
LYMPHOCYTES # BLD AUTO: 3.5 % — LOW (ref 20.5–51.1)
MCHC RBC-ENTMCNC: 31.5 PG — HIGH (ref 27–31)
MCHC RBC-ENTMCNC: 32.6 G/DL — SIGNIFICANT CHANGE UP (ref 32–37)
MCV RBC AUTO: 96.5 FL — HIGH (ref 80–94)
MONOCYTES # BLD AUTO: 1.55 K/UL — HIGH (ref 0.1–0.6)
MONOCYTES NFR BLD AUTO: 3.6 % — SIGNIFICANT CHANGE UP (ref 1.7–9.3)
NEUTROPHILS # BLD AUTO: 35.52 K/UL — HIGH (ref 1.4–6.5)
NEUTROPHILS NFR BLD AUTO: 82.3 % — HIGH (ref 42.2–75.2)
NRBC # BLD: 0 /100 WBCS — SIGNIFICANT CHANGE UP (ref 0–0)
PLATELET # BLD AUTO: 147 K/UL — SIGNIFICANT CHANGE UP (ref 130–400)
POTASSIUM SERPL-MCNC: 3.2 MMOL/L — LOW (ref 3.5–5)
POTASSIUM SERPL-SCNC: 3.2 MMOL/L — LOW (ref 3.5–5)
PROT SERPL-MCNC: 6.4 G/DL — SIGNIFICANT CHANGE UP (ref 6–8)
RBC # BLD: 3.75 M/UL — LOW (ref 4.7–6.1)
RBC # FLD: 12.1 % — SIGNIFICANT CHANGE UP (ref 11.5–14.5)
SODIUM SERPL-SCNC: 145 MMOL/L — SIGNIFICANT CHANGE UP (ref 135–146)
SODIUM UR-SCNC: 43 MMOL/L — SIGNIFICANT CHANGE UP
WBC # BLD: 43.11 K/UL — CRITICAL HIGH (ref 4.8–10.8)
WBC # FLD AUTO: 43.11 K/UL — CRITICAL HIGH (ref 4.8–10.8)

## 2021-02-25 PROCEDURE — 99233 SBSQ HOSP IP/OBS HIGH 50: CPT

## 2021-02-25 PROCEDURE — 99222 1ST HOSP IP/OBS MODERATE 55: CPT

## 2021-02-25 RX ORDER — POTASSIUM CHLORIDE 20 MEQ
20 PACKET (EA) ORAL
Refills: 0 | Status: COMPLETED | OUTPATIENT
Start: 2021-02-25 | End: 2021-02-25

## 2021-02-25 RX ORDER — CEFTRIAXONE 500 MG/1
2000 INJECTION, POWDER, FOR SOLUTION INTRAMUSCULAR; INTRAVENOUS EVERY 24 HOURS
Refills: 0 | Status: DISCONTINUED | OUTPATIENT
Start: 2021-02-25 | End: 2021-03-04

## 2021-02-25 RX ADMIN — HEPARIN SODIUM 5000 UNIT(S): 5000 INJECTION INTRAVENOUS; SUBCUTANEOUS at 20:38

## 2021-02-25 RX ADMIN — FINASTERIDE 5 MILLIGRAM(S): 5 TABLET, FILM COATED ORAL at 12:26

## 2021-02-25 RX ADMIN — CHLORHEXIDINE GLUCONATE 1 APPLICATION(S): 213 SOLUTION TOPICAL at 05:50

## 2021-02-25 RX ADMIN — TAMSULOSIN HYDROCHLORIDE 0.4 MILLIGRAM(S): 0.4 CAPSULE ORAL at 20:38

## 2021-02-25 RX ADMIN — Medication 20 MILLIEQUIVALENT(S): at 15:45

## 2021-02-25 RX ADMIN — CEFTRIAXONE 100 MILLIGRAM(S): 500 INJECTION, POWDER, FOR SOLUTION INTRAMUSCULAR; INTRAVENOUS at 18:50

## 2021-02-25 RX ADMIN — MEROPENEM 100 MILLIGRAM(S): 1 INJECTION INTRAVENOUS at 08:11

## 2021-02-25 RX ADMIN — Medication 20 MILLIEQUIVALENT(S): at 13:19

## 2021-02-25 RX ADMIN — HEPARIN SODIUM 5000 UNIT(S): 5000 INJECTION INTRAVENOUS; SUBCUTANEOUS at 05:49

## 2021-02-25 RX ADMIN — HEPARIN SODIUM 5000 UNIT(S): 5000 INJECTION INTRAVENOUS; SUBCUTANEOUS at 12:26

## 2021-02-25 NOTE — PHYSICAL THERAPY INITIAL EVALUATION ADULT - GENERAL OBSERVATIONS, REHAB EVAL
Pt was seen semifowler with a lap belt and IV attached. Nurse came in and unattached the IV. Pt is confused and O&A to self only. Bed mobility Mod A, Sit-stand Mod A, Transfer Mod A, ambulated 10 ft with RW Mod A. pt was left in the bed due to poor safety with lap belt. RN (Fátima) was alerted

## 2021-02-25 NOTE — DIETITIAN INITIAL EVALUATION ADULT. - OTHER INFO
pertinent medical information:  # Sepsis on admission  # E. coli UTI  # High grade E. coli bacteremia- worsening leukocytosis   # NIYA on CKD 3- hypovolemia vs septic ATN   # c/f rectal bleed  #Macrocytic Anemia    pertinent subjective information: EMR po documentation 65% 2/24. PCA at bedside -- says he didn't eat much at all for breakfast, had few bites of pancakes. Says she is new to pt and doesn't know how he ate previously.

## 2021-02-25 NOTE — CONSULT NOTE ADULT - SUBJECTIVE AND OBJECTIVE BOX
NEPHROLOGY CONSULTATION NOTE    THIS CONSULT IS INCOMPLETE / FULL CONSULT TO FOLLOW    Patient is a 89y old  Male who presents with a chief complaint of UTI (2021 17:07)      HPI:  89y M PMD BPH presents for eval of fever. Pt developed fever this week with associated n/v, relieved with Tylenol, no aggravating factors. Patient reports weakness, fatigue and dysuria and poor PO intake for last few days. Also some chronic difficulty to urinate. A&Ox2 which per daughter is baseline. Per daughter he lost 30-40 pounds in last few months and has been bleeding rectally. He denies other symptoms, including chest pain, SOB, abdominal pain, back pain. ED course: T(F): 98.3, HR: 69, BP: 175/74. SpO2: 98% on RA. WBC 16.25, Cr 2.3, 104 WBCs in urine. (2021 15:13)   Nephrology was consulted for significant NIYA on CKD.      PAST MEDICAL & SURGICAL HISTORY:  BPH (benign prostatic hyperplasia)    No significant past surgical history      Allergies:  Allergy Status Unknown    Home Medications Reviewed  Hospital Medications:   MEDICATIONS  (STANDING):  chlorhexidine 4% Liquid 1 Application(s) Topical <User Schedule>  finasteride 5 milliGRAM(s) Oral daily  heparin   Injectable 5000 Unit(s) SubCutaneous every 8 hours  lactated ringers. 1000 milliLiter(s) (75 mL/Hr) IV Continuous <Continuous>  meropenem  IVPB 500 milliGRAM(s) IV Intermittent every 12 hours  tamsulosin 0.4 milliGRAM(s) Oral at bedtime      SOCIAL HISTORY:  Denies ETOH,Smoking,   FAMILY HISTORY:  No pertinent family history in first degree relatives    REVIEW OF SYSTEMS:  CONSTITUTIONAL: Weakness, no fevers or chills  EYES/ENT: No visual changes;  No vertigo or throat pain   NECK: No pain or stiffness  RESPIRATORY: No cough, wheezing, hemoptysis; No shortness of breath  CARDIOVASCULAR: No chest pain or palpitations.  GASTROINTESTINAL: No abdominal or epigastric pain. No nausea, vomiting, or hematemesis; No diarrhea or constipation. No melena or hematochezia.  GENITOURINARY: Dysuria, Suprapubic pain. Deniesfrequency, foamy urine, urinary urgency, incontinence or hematuria  VASCULAR: No bilateral lower extremity edema.   All other review of systems is negative unless indicated above.    VITALS:  T(F): 97.4 (21 @ 20:36), Max: 97.4 (21 @ 20:36)  HR: 73 (21 @ 20:36)  BP: 123/57 (21 @ 20:36)  RR: 18 (21 @ 20:36)  SpO2: --     @ 07:01  -   @ 07:00  --------------------------------------------------------  IN: 950 mL / OUT: 0 mL / NET: 950 mL            I&O's Detail    2021 07:  -  2021 07:00  --------------------------------------------------------  IN:    IV PiggyBack: 50 mL    Lactated Ringers: 900 mL  Total IN: 950 mL    OUT:  Total OUT: 0 mL    Total NET: 950 mL      PHYSICAL EXAM:  Constitutional: NAD,   HEENT: dry mucous membrane, anicteric sclera, oropharynx clear, MMM  Neck: No JVD  Respiratory: CTAB, + crackles  Cardiovascular: S1, S2, RRR  Gastrointestinal: Suprapubic tenderness, BS+, soft,   Extremities: No cyanosis or clubbing. No peripheral edema  Neurological: A/O x 2  : No CVA tenderness. No erazo.       LABS:      145  |  102  |  97<HH>  ----------------------------<  72  3.2<L>   |  23  |  2.9<H>    Ca    8.4<L>      2021 05:50    TPro  6.4  /  Alb  3.1<L>  /  TBili  0.4  /  DBili      /  AST  117<H>  /  ALT  56<H>  /  AlkPhos  139<H>      Creatinine Trend: 2.9 <--, 3.6 <--, 2.3 <--                        11.8   43.11 )-----------( 147      ( 2021 05:50 )             36.2     Urine Studies:  Urinalysis Basic - ( 2021 12:16 )    Color: Yellow / Appearance: Slightly Turbid / S.018 / pH:   Gluc:  / Ketone: Negative  / Bili: Negative / Urobili: <2 mg/dL   Blood:  / Protein: 30 mg/dL / Nitrite: Negative   Leuk Esterase: Large / RBC: 114 /HPF /  /HPF   Sq Epi:  / Non Sq Epi: 0 /HPF / Bacteria: Many      Sodium, Random Urine: 43.0 mmoL/L ( @ 06:00)  Creatinine, Random Urine: 43 mg/dL ( @ 06:00)            RADIOLOGY & ADDITIONAL STUDIES:                 NEPHROLOGY CONSULTATION NOTE    THIS CONSULT IS INCOMPLETE / FULL CONSULT TO FOLLOW    Patient is a 89y old  Male who presents with a chief complaint of UTI (2021 17:07)      HPI:  89y M PMD BPH presents for eval of fever. Pt developed fever this week with associated n/v, relieved with Tylenol, no aggravating factors. Patient reports weakness, fatigue and dysuria and poor PO intake for last few days. Also some chronic difficulty to urinate. A&Ox2 which per daughter is baseline. Per daughter he lost 30-40 pounds in last few months and has been bleeding rectally. He denies other symptoms, including chest pain, SOB, abdominal pain, back pain. ED course: T(F): 98.3, HR: 69, BP: 175/74. SpO2: 98% on RA. WBC 16.25, Cr 2.3, 104 WBCs in urine. (2021 15:13)   Nephrology was consulted for significant NIYA on CKD.      PAST MEDICAL & SURGICAL HISTORY:  BPH (benign prostatic hyperplasia)    No significant past surgical history      Allergies:  Allergy Status Unknown    Home Medications Reviewed  Hospital Medications:   MEDICATIONS  (STANDING):  chlorhexidine 4% Liquid 1 Application(s) Topical <User Schedule>  finasteride 5 milliGRAM(s) Oral daily  heparin   Injectable 5000 Unit(s) SubCutaneous every 8 hours  lactated ringers. 1000 milliLiter(s) (75 mL/Hr) IV Continuous <Continuous>  meropenem  IVPB 500 milliGRAM(s) IV Intermittent every 12 hours  tamsulosin 0.4 milliGRAM(s) Oral at bedtime      SOCIAL HISTORY:  Denies ETOH,Smoking,   FAMILY HISTORY:  No pertinent family history in first degree relatives    REVIEW OF SYSTEMS:  CONSTITUTIONAL: Weakness, no fevers or chills  EYES/ENT: No visual changes;  No vertigo or throat pain   NECK: No pain or stiffness  RESPIRATORY: No cough, wheezing, hemoptysis; No shortness of breath  CARDIOVASCULAR: No chest pain or palpitations.  GASTROINTESTINAL: No abdominal or epigastric pain. No nausea, vomiting, or hematemesis; No diarrhea or constipation. No melena or hematochezia.  GENITOURINARY: Dysuria, Suprapubic pain. Deniesfrequency, foamy urine, urinary urgency, incontinence or hematuria  VASCULAR: No bilateral lower extremity edema.   All other review of systems is negative unless indicated above.    VITALS:  T(F): 97.4 (21 @ 20:36), Max: 97.4 (21 @ 20:36)  HR: 73 (21 @ 20:36)  BP: 123/57 (21 @ 20:36)  RR: 18 (21 @ 20:36)  SpO2: --     @ 07:01  -   @ 07:00  --------------------------------------------------------  IN: 950 mL / OUT: 0 mL / NET: 950 mL            I&O's Detail    2021 07:  -  2021 07:00  --------------------------------------------------------  IN:    IV PiggyBack: 50 mL    Lactated Ringers: 900 mL  Total IN: 950 mL    OUT:  Total OUT: 0 mL    Total NET: 950 mL      PHYSICAL EXAM:  Constitutional: NAD,   HEENT: dry mucous membrane, anicteric sclera, oropharynx clear, MMM  Neck: No JVD  Respiratory: CTAB, + crackles  Cardiovascular: S1, S2, RRR  Gastrointestinal: Suprapubic tenderness, BS+, soft,   Extremities: No cyanosis or clubbing. No peripheral edema  Neurological: A/O x 2  : No CVA tenderness. No erazo.       LABS:      145  |  102  |  97<HH>  ----------------------------<  72  3.2<L>   |  23  |  2.9<H>    Ca    8.4<L>      2021 05:50    TPro  6.4  /  Alb  3.1<L>  /  TBili  0.4  /  DBili      /  AST  117<H>  /  ALT  56<H>  /  AlkPhos  139<H>      Creatinine Trend: 2.9 <--, 3.6 <--, 2.3 <--                        11.8   43.11 )-----------( 147      ( 2021 05:50 )             36.2     Urine Studies:  Urinalysis Basic - ( 2021 12:16 )    Color: Yellow / Appearance: Slightly Turbid / S.018 / pH:   Gluc:  / Ketone: Negative  / Bili: Negative / Urobili: <2 mg/dL   Blood:  / Protein: 30 mg/dL / Nitrite: Negative   Leuk Esterase: Large / RBC: 114 /HPF /  /HPF   Sq Epi:  / Non Sq Epi: 0 /HPF / Bacteria: Many      Sodium, Random Urine: 43.0 mmoL/L ( @ 06:00)  Creatinine, Random Urine: 43 mg/dL ( @ 06:00)                         NEPHROLOGY CONSULTATION NOTE    THIS CONSULT IS INCOMPLETE / FULL CONSULT TO FOLLOW    Patient is a 89y old  Male who presents with a chief complaint of UTI (2021 17:07)      HPI:  89y M PMD BPH presents for eval of fever. Pt developed fever this week with associated n/v, relieved with Tylenol, no aggravating factors. Patient reports weakness, fatigue and dysuria and poor PO intake for last few days. Also some chronic difficulty to urinate. A&Ox2 which per daughter is baseline. Per daughter he lost 30-40 pounds in last few months and has been bleeding rectally. He denies other symptoms, including chest pain, SOB, abdominal pain, back pain. ED course: T(F): 98.3, HR: 69, BP: 175/74. SpO2: 98% on RA. WBC 16.25, Cr 2.3, 104 WBCs in urine. (2021 15:13)   Nephrology was consulted for significant NIYA on CKD.      PAST MEDICAL & SURGICAL HISTORY:  BPH (benign prostatic hyperplasia)    No significant past surgical history      Allergies:  Allergy Status Unknown    Home Medications Reviewed  Hospital Medications:   MEDICATIONS  (STANDING):  chlorhexidine 4% Liquid 1 Application(s) Topical <User Schedule>  finasteride 5 milliGRAM(s) Oral daily  heparin   Injectable 5000 Unit(s) SubCutaneous every 8 hours  lactated ringers. 1000 milliLiter(s) (75 mL/Hr) IV Continuous <Continuous>  meropenem  IVPB 500 milliGRAM(s) IV Intermittent every 12 hours  tamsulosin 0.4 milliGRAM(s) Oral at bedtime      SOCIAL HISTORY:  Denies ETOH,Smoking,   FAMILY HISTORY:  No pertinent family history in first degree relatives    REVIEW OF SYSTEMS:  CONSTITUTIONAL: Weakness, no fevers or chills  EYES/ENT: No visual changes;  No vertigo or throat pain   NECK: No pain or stiffness  RESPIRATORY: No cough, wheezing, hemoptysis; No shortness of breath  CARDIOVASCULAR: No chest pain or palpitations.  GASTROINTESTINAL: No abdominal or epigastric pain. No nausea, vomiting, or hematemesis; No diarrhea or constipation. No melena or hematochezia.  GENITOURINARY: Dysuria, Suprapubic pain. Deniesfrequency, foamy urine, urinary urgency, incontinence or hematuria  VASCULAR: No bilateral lower extremity edema.   All other review of systems is negative unless indicated above.    VITALS:  T(F): 97.4 (21 @ 20:36), Max: 97.4 (21 @ 20:36)  HR: 73 (21 @ 20:36)  BP: 123/57 (21 @ 20:36)  RR: 18 (21 @ 20:36)  SpO2: --     @ 07:01  -   @ 07:00  --------------------------------------------------------  IN: 950 mL / OUT: 0 mL / NET: 950 mL            I&O's Detail    2021 07:  -  2021 07:00  --------------------------------------------------------  IN:    IV PiggyBack: 50 mL    Lactated Ringers: 900 mL  Total IN: 950 mL    OUT:  Total OUT: 0 mL    Total NET: 950 mL      PHYSICAL EXAM:  Constitutional: NAD,   HEENT: dry mucous membrane, anicteric sclera, oropharynx clear, MMM  Neck: No JVD  Respiratory: CTAB  Cardiovascular: S1, S2, RRR  Gastrointestinal: Suprapubic tenderness, BS+, soft,   Extremities: No cyanosis or clubbing. No peripheral edema  Neurological: A/O x 2  : No CVA tenderness. No erazo.       LABS:      145  |  102  |  97<HH>  ----------------------------<  72  3.2<L>   |  23  |  2.9<H>    Ca    8.4<L>      2021 05:50    TPro  6.4  /  Alb  3.1<L>  /  TBili  0.4  /  DBili      /  AST  117<H>  /  ALT  56<H>  /  AlkPhos  139<H>      Creatinine Trend: 2.9 <--, 3.6 <--, 2.3 <--                        11.8   43.11 )-----------( 147      ( 2021 05:50 )             36.2     Urine Studies:  Urinalysis Basic - ( 2021 12:16 )    Color: Yellow / Appearance: Slightly Turbid / S.018 / pH:   Gluc:  / Ketone: Negative  / Bili: Negative / Urobili: <2 mg/dL   Blood:  / Protein: 30 mg/dL / Nitrite: Negative   Leuk Esterase: Large / RBC: 114 /HPF /  /HPF   Sq Epi:  / Non Sq Epi: 0 /HPF / Bacteria: Many      Sodium, Random Urine: 43.0 mmoL/L ( @ 06:00)  Creatinine, Random Urine: 43 mg/dL ( @ 06:00)                         NEPHROLOGY CONSULTATION NOTE    Patient is a 89y old  Male who presents with a chief complaint of UTI (2021 17:07)      HPI:  89y M PMD BPH presents for eval of fever. Pt developed fever this week with associated n/v, relieved with Tylenol, no aggravating factors. Patient reports weakness, fatigue and dysuria and poor PO intake for last few days. Also some chronic difficulty to urinate. A&Ox2 which per daughter is baseline. Per daughter he lost 30-40 pounds in last few months and has been bleeding rectally. He denies other symptoms, including chest pain, SOB, abdominal pain, back pain. ED course: T(F): 98.3, HR: 69, BP: 175/74. SpO2: 98% on RA. WBC 16.25, Cr 2.3, 104 WBCs in urine. (2021 15:13)   Nephrology was consulted for significant NIYA on CKD.      PAST MEDICAL & SURGICAL HISTORY:  BPH (benign prostatic hyperplasia)    No significant past surgical history      Allergies:  Allergy Status Unknown    Home Medications Reviewed  Hospital Medications:   MEDICATIONS  (STANDING):  chlorhexidine 4% Liquid 1 Application(s) Topical <User Schedule>  finasteride 5 milliGRAM(s) Oral daily  heparin   Injectable 5000 Unit(s) SubCutaneous every 8 hours  lactated ringers. 1000 milliLiter(s) (75 mL/Hr) IV Continuous <Continuous>  meropenem  IVPB 500 milliGRAM(s) IV Intermittent every 12 hours  tamsulosin 0.4 milliGRAM(s) Oral at bedtime      SOCIAL HISTORY:  Denies ETOH,Smoking,   FAMILY HISTORY:  No pertinent family history in first degree relatives    REVIEW OF SYSTEMS:  CONSTITUTIONAL: Weakness, no fevers or chills  EYES/ENT: No visual changes;  No vertigo or throat pain   NECK: No pain or stiffness  RESPIRATORY: No cough, wheezing, hemoptysis; No shortness of breath  CARDIOVASCULAR: No chest pain or palpitations.  GASTROINTESTINAL: No abdominal or epigastric pain. No nausea, vomiting, or hematemesis; No diarrhea or constipation. No melena or hematochezia.  GENITOURINARY: Dysuria, Suprapubic pain. Deniesfrequency, foamy urine, urinary urgency, incontinence or hematuria  VASCULAR: No bilateral lower extremity edema.   All other review of systems is negative unless indicated above.    VITALS:  T(F): 97.4 (21 @ 20:36), Max: 97.4 (21 @ 20:36)  HR: 73 (21 @ 20:36)  BP: 123/57 (21 @ 20:36)  RR: 18 (21 @ 20:36)  SpO2: --     @ 07:01  -   @ 07:00  --------------------------------------------------------  IN: 950 mL / OUT: 0 mL / NET: 950 mL            I&O's Detail    2021 07:  -  2021 07:00  --------------------------------------------------------  IN:    IV PiggyBack: 50 mL    Lactated Ringers: 900 mL  Total IN: 950 mL    OUT:  Total OUT: 0 mL    Total NET: 950 mL      PHYSICAL EXAM:  Constitutional: NAD,   HEENT: dry mucous membrane, anicteric sclera, oropharynx clear, MMM  Neck: No JVD  Respiratory: CTAB  Cardiovascular: S1, S2, RRR  Gastrointestinal: Suprapubic tenderness, BS+, soft,   Extremities: No cyanosis or clubbing. No peripheral edema  Neurological: A/O x 2  : No CVA tenderness. No erazo.       LABS:      145  |  102  |  97<HH>  ----------------------------<  72  3.2<L>   |  23  |  2.9<H>    Ca    8.4<L>      2021 05:50    TPro  6.4  /  Alb  3.1<L>  /  TBili  0.4  /  DBili      /  AST  117<H>  /  ALT  56<H>  /  AlkPhos  139<H>      Creatinine Trend: 2.9 <--, 3.6 <--, 2.3 <--                        11.8   43.11 )-----------( 147      ( 2021 05:50 )             36.2     Urine Studies:  Urinalysis Basic - ( 2021 12:16 )    Color: Yellow / Appearance: Slightly Turbid / S.018 / pH:   Gluc:  / Ketone: Negative  / Bili: Negative / Urobili: <2 mg/dL   Blood:  / Protein: 30 mg/dL / Nitrite: Negative   Leuk Esterase: Large / RBC: 114 /HPF /  /HPF   Sq Epi:  / Non Sq Epi: 0 /HPF / Bacteria: Many      Sodium, Random Urine: 43.0 mmoL/L ( @ 06:00)  Creatinine, Random Urine: 43 mg/dL ( @ 06:00)

## 2021-02-25 NOTE — PROGRESS NOTE ADULT - ASSESSMENT
89y M PMHx of BPH admitted for Sepsis 2/2 UTI and NIYA.    # Sepsis 2/2 E.Coli UTI   # E.Coli Bacteremia   #Toxic Metabolic Encephalopathy  - dysuria, leukocytosis, positive UA on adm   - Renal sono w/o hydro   - Blood Cx w/ E.Coli 2/2 cultures , Urine Cx w/ E. Coli- susceptibilities pending   - switched to Meropenem last night due to significant rise in leukocytosis ( 16--> 56 )   - ID pending   - repeat blood cultures pending     #Severe Leukocytosis - improving   - infection vs acute leukemia   - Hematology consulted:   -- will send leukocyte alk phos and Flow Cytometry   - ID consulted:   -- abx reccomendations pending     # Transaminitis  - likely in the setting of sepsis   - will monitor closely     # NIYA on CKD 3  - likely septic ATN   - Fena is intrinsic pathology   - Cr 3.6-->2.9 today, Baseline Cr 1.2 per franny BARNES  - pending protein/cr ratio  - S/p 1L in ED, cont light hydration  - Nephrology consult pending     # c/f rectal bleed  - Last colonoscopy 5-10 years ago per daughter  - Vitals, Hg stable  - No bleeding since presentation to ED  - monitor closely     #Macrocytic Anemia  - will send B 12 and Folate levels - pending     # BPH  - Continue finasteride  - Start flomax    #Poor PO intake   - daughter is concerned about poor intake and recent weight loss   - nutrition consult placed   - although pt is currently w/ AMS due to sepsis   - will monitor closely       DVT PPX: Heparin    DNR/DNI    Family: Spoke at length w/ Rajeev ( Daughter ) [phone] about clinical condition, concern for poor prognosis, pt DNR/DNI on 2/24        Nikole Thurston DO      89y M PMHx of BPH admitted for Sepsis 2/2 UTI and NIYA.    # Sepsis 2/2 E.Coli UTI   # E.Coli Bacteremia   #Toxic Metabolic Encephalopathy  - dysuria, leukocytosis, positive UA on adm   - Renal sono w/o hydro   - Blood Cx w/ E.Coli 2/2 cultures , Urine Cx w/ E. Coli- susceptibilities pending   - switched to Meropenem last night due to significant rise in leukocytosis ( 16--> 56 )   - ID pending   - repeat blood cultures pending     #Severe Leukocytosis - improving   - infection vs acute leukemia   - Hematology consulted:   -- will send leukocyte alk phos and Flow Cytometry   - ID consulted:   -- abx reccomendations pending     # Transaminitis  - likely in the setting of sepsis   - will monitor closely     # NIYA on CKD 3  - likely septic ATN   - Fena is intrinsic pathology   - Cr 3.6-->2.9 today, Baseline Cr 1.2 per franny BARNES  - pending protein/cr ratio  - S/p 1L in ED, cont light hydration  - Nephrology consult pending     # c/f rectal bleed  - Last colonoscopy 5-10 years ago per daughter  - Vitals, Hg stable  - No bleeding since presentation to ED  - monitor closely     #Macrocytic Anemia  - will send B 12 and Folate levels - pending     # BPH  - Continue finasteride  - Start flomax    #Poor PO intake   - daughter is concerned about poor intake and recent weight loss   - nutrition consult placed   - although pt is currently w/ AMS due to sepsis   - will monitor closely       DVT PPX: Heparin    DNR/DNI    Family: Spoke at length w/ Rajeev ( Daughter ) [phone] about clinical condition and prognosis on 2/25        Nikole Thurston DO

## 2021-02-25 NOTE — PROGRESS NOTE ADULT - SUBJECTIVE AND OBJECTIVE BOX
HIEN, MIGUEL ÁNGEL  89y, Male  Allergy: Allergy Status Unknown    Hospital Day: 2d    Patient seen and examined earlier today. Patient resting comfortably now, was apparently very agitated/trying to climb out of bed this morning. Abdominal restraints in place.     PMH/PSH:  PAST MEDICAL & SURGICAL HISTORY:  BPH (benign prostatic hyperplasia)    No significant past surgical history        LAST 24-Hr EVENTS:    VITALS:  T(F): 97.4 (02-24-21 @ 20:36), Max: 97.4 (02-24-21 @ 20:36)  HR: 73 (02-24-21 @ 20:36)  BP: 123/57 (02-24-21 @ 20:36) (123/57 - 134/64)  RR: 18 (02-24-21 @ 20:36)  SpO2: --        TESTS & MEASUREMENTS:  Weight (Kg): 55.2 (02-24-21 @ 00:51)  BMI (kg/m2): 19.1 (02-25)    02-23-21 @ 07:01  -  02-24-21 @ 07:00  --------------------------------------------------------  IN: 375 mL / OUT: 0 mL / NET: 375 mL    02-24-21 @ 07:01  -  02-25-21 @ 07:00  --------------------------------------------------------  IN: 950 mL / OUT: 0 mL / NET: 950 mL                            11.8   43.11 )-----------( 147      ( 25 Feb 2021 05:50 )             36.2       02-25    145  |  102  |  97<HH>  ----------------------------<  72  3.2<L>   |  23  |  2.9<H>    Ca    8.4<L>      25 Feb 2021 05:50    TPro  6.4  /  Alb  3.1<L>  /  TBili  0.4  /  DBili  x   /  AST  117<H>  /  ALT  56<H>  /  AlkPhos  139<H>  02-25    LIVER FUNCTIONS - ( 25 Feb 2021 05:50 )  Alb: 3.1 g/dL / Pro: 6.4 g/dL / ALK PHOS: 139 U/L / ALT: 56 U/L / AST: 117 U/L / GGT: x                 Culture - Urine (collected 02-23-21 @ 12:16)  Source: .Urine Clean Catch (Midstream)  Preliminary Report (02-25-21 @ 02:47):    >100,000 CFU/ml Escherichia coli    Culture - Blood (collected 02-23-21 @ 12:16)  Source: .Blood Blood-Peripheral  Gram Stain (02-24-21 @ 09:04):    Growth in aerobic bottle: Gram Negative Rods    Growth in anaerobic bottle: Gram Negative Rods  Preliminary Report (02-24-21 @ 09:05):    Growth in aerobic bottle: Gram Negative Rods    Growth in anaerobic bottle: Gram Negative Rods    Culture - Blood (collected 02-23-21 @ 12:08)  Source: .Blood Blood-Peripheral  Gram Stain (02-24-21 @ 06:14):    Growth in aerobic and anaerobic bottles: Gram Negative Rods  Preliminary Report (02-25-21 @ 09:50):    Growth in aerobic and anaerobic bottles: Escherichia coli    ***Blood Panel PCR results on this specimen are available    approximately 3 hours after the Gram stain result.***    Gram stain, PCR, and/or culture results may not always    correspond due to difference in methodologies.    ************************************************************    This PCR assay was performed by multiplex PCR. This    Assay tests for 66 bacterial and resistance gene targets.    Please refer to the [facility] [employer] test directory    at [email or web address] for details.  Organism: Blood Culture PCR (02-24-21 @ 08:07)  Organism: Blood Culture PCR (02-24-21 @ 08:07)      -  Escherichia coli: Detec      Method Type: PCR                    RADIOLOGY, ECG, & ADDITIONAL TESTS:      RECENT DIAGNOSTIC ORDERS:  Diet, Soft:      Qty per Day:  Vanilla flavor only  Supplement Feeding Modality:  Oral  Ensure Enlive Cans or Servings Per Day:  1       Frequency:  Daily  Ensure Pudding Cans or Servings Per Day:  1       Frequency:  Two Times a day (02-25-21 @ 12:02)  Complete Blood Count: STAT (02-24-21 @ 18:02)  Folate, Serum: AM Sched. Collection: 25-Feb-2021 04:30 (02-24-21 @ 17:33)  Vitamin B12, Serum: AM Sched. Collection: 25-Feb-2021 04:30 (02-24-21 @ 17:33)  Culture - Blood: AM Sched. Collection:25-Feb-2021 04:30  Specimen Source: Blood (02-24-21 @ 17:22)  Culture - Blood: AM Sched. Collection:25-Feb-2021 04:30  Specimen Source: Blood (02-24-21 @ 17:22)      MEDICATIONS:  MEDICATIONS  (STANDING):  chlorhexidine 4% Liquid 1 Application(s) Topical <User Schedule>  finasteride 5 milliGRAM(s) Oral daily  heparin   Injectable 5000 Unit(s) SubCutaneous every 8 hours  lactated ringers. 1000 milliLiter(s) (75 mL/Hr) IV Continuous <Continuous>  meropenem  IVPB 500 milliGRAM(s) IV Intermittent every 12 hours  potassium chloride    Tablet ER 20 milliEquivalent(s) Oral every 2 hours  tamsulosin 0.4 milliGRAM(s) Oral at bedtime    MEDICATIONS  (PRN):  acetaminophen  Suppository .. 650 milliGRAM(s) Rectal every 6 hours PRN Temp greater or equal to 38C (100.4F), Mild Pain (1 - 3)      HOME MEDICATIONS:  finasteride 1 mg oral tablet (02-23)      PHYSICAL EXAM:  GENERAL: resting comfortably, unable to assess orientation  HNENT: NCAT PERRLA    NECK: No LAD/swelling  CHEST/LUNG:  CTAB no wheezes/rales/ronchi  HEART: RRR, No murmurs  ABDOMEN: Soft, NT, ND,  Bowel sounds present  EXTREMITIES:  Warm well perfused, no edema   Psych: agitation

## 2021-02-25 NOTE — CONSULT NOTE ADULT - ATTENDING COMMENTS
seen/examined w/ hemonc fellow; note reviewed; plan discussed    elevated wbc with mild anemia and no thrombocytopenia in the setting of infection.    review of PBS is pending    most likely secondary to infection; obtain BCR ABL (and LAP score) to r/o CML    F/U given in hematology clinic
Pt seen examined  Agree with above.  89/M with BPH presents with fever, poor po intake, found to have NIYA on admission and WBC of 50x.  E.coli bacteremia.  Creat peaked at 3.6 mg % and is improving with IVF down to 2.6 mg%  No hydro on sono, baseline creat 1.2 mg%  WBC 2 sepsis vs hematological malignancy  Improving on Merrem (renal dose)  ID consult, surveillance Cx  K being repleted, strict Is and Os - will follow
I have personally examined the patient and reviewed the documentation above.  Corrections and edits were made wherever needed.

## 2021-02-25 NOTE — CONSULT NOTE ADULT - ASSESSMENT
89 year old male with PMHx of BPH presents for fever and found to have gram negative bacteremia, also has had 3o lb weight loss over several months, heme consulted for leukocytosis.    Leukocytosis: Still awaiting this morning's CBC however platelets are wnl, hemoglobin above 10 makes acute leukemia less likely. If it is AML, would need GOC discussion as he would most likely not be a candidate for treatment. The acute rise is neutrophil predominant and he has a source of infection so more likely reactive, however can r/o primary blood disorder  - Check leukocyte alk phosph  - Check flow cytometry for r/o AML, r/o CML (check BCR-ABL)  - Treat underlying infection (currently on ceftriaxone for e. coli in urine and likely in blood)  - Monitor CBC

## 2021-02-25 NOTE — PROGRESS NOTE ADULT - CONVERSATION DETAILS
Discussed severity of illness, patient has severe sepsis 2/2 E.Coli UTI and Bacteremia w/ significant leukocytosis c/f hematological malignancy. Also with NIYA and electrolyte abnormalities. In conjunction with patient's chronic medical conditions and age, he has a very poor prognosis if he were to undergo cardiopulmonary arrest. Daughter is in agreement and does not want her father to suffer.     DNR/DNI       Nikole Thurston, DO

## 2021-02-25 NOTE — CONSULT NOTE ADULT - ASSESSMENT
Patient is a 89y old  Male who presents with a chief complaint of UTI. Renal was consulted for NIYA with creatinine of 3.6 on CKD.    #UTI w/ ecoli bacteremia   #NIYA on CKD likely secondary to hypovolemia  #hypokalemia > K 3.2   - cr 1.2 (baseline) > 2.3 >3.6> 2.9 (today)  - Continue LR @ 75cc/hr  - check U lytes, serum Mg and phosphorus   - Strict I&O's  - replete K and serial BMP  - US K&B (2/23/21) > unremarkable   - WBC 16 (admission) > 56 > 43 (today), monitor CBC  - Avoid dehydration and nephrotoxic drugs    will follow

## 2021-02-25 NOTE — DIETITIAN INITIAL EVALUATION ADULT. - ORAL INTAKE PTA/DIET HISTORY
unable to reach only 1 emergency contact # in EMR; tried calling Rajeev ( Daughter ) [phone] -- found in progress notes -- no response. Unable to obtain nut hx, will attempt at f/u. Pt himself confused at RD visit.

## 2021-02-25 NOTE — DIETITIAN INITIAL EVALUATION ADULT. - PERTINENT MEDS FT
MEDICATIONS  (STANDING):  finasteride 5 milliGRAM(s) Oral daily  heparin   Injectable 5000 Unit(s) SubCutaneous every 8 hours  lactated ringers. 1000 milliLiter(s) (75 mL/Hr) IV Continuous <Continuous>  meropenem  IVPB 500 milliGRAM(s) IV Intermittent every 12 hours  potassium chloride    Tablet ER 20 milliEquivalent(s) Oral every 2 hours  tamsulosin 0.4 milliGRAM(s) Oral at bedtime

## 2021-02-25 NOTE — CONSULT NOTE ADULT - SUBJECTIVE AND OBJECTIVE BOX
Patient is a 89y old  Male who presents with a chief complaint of UTI (24 Feb 2021 17:07)      HPI:  89y M PMD BPH presents for eval of fever. Pt developed fever this week with associated n/v, relieved with Tylenol, no aggravating factors. Patient reports weakness, fatigue and dysuria and poor PO intake for last few days. Also some chronic difficulty to urinate. A&Ox2 which per daughter is baseline.   Per daughter he lost 30-40 pounds in last few months and has been bleeding rectally.  He denies other symptoms, including chest pain, SOB, abdominal pain, back pain.    ED course: T(F): 98.3, HR: 69, BP: 175/74. SpO2: 98% on RA. WBC 16.25, Cr 2.3, 104 WBCs in urine. (23 Feb 2021 15:13)     Interval Hx: Patient found to have gram negative bacteremia. His WBC burt to 56, neutrophil predominant, some metamyelocytes and myelocytes seen on diff. His platelets are wnl. His hemoglobin was near normal (12.6, on admission, dropped to 10.7He has baseline dementia and is sundowning at night    ROS negative except for the above.     PAST MEDICAL & SURGICAL HISTORY:  BPH (benign prostatic hyperplasia)    No significant past surgical history        SOCIAL HISTORY:    FAMILY HISTORY:  No pertinent family history in first degree relatives        MEDICATIONS  (STANDING):  chlorhexidine 4% Liquid 1 Application(s) Topical <User Schedule>  finasteride 5 milliGRAM(s) Oral daily  heparin   Injectable 5000 Unit(s) SubCutaneous every 8 hours  lactated ringers. 1000 milliLiter(s) (75 mL/Hr) IV Continuous <Continuous>  meropenem  IVPB 500 milliGRAM(s) IV Intermittent every 12 hours  tamsulosin 0.4 milliGRAM(s) Oral at bedtime    MEDICATIONS  (PRN):  acetaminophen  Suppository .. 650 milliGRAM(s) Rectal every 6 hours PRN Temp greater or equal to 38C (100.4F), Mild Pain (1 - 3)      Allergies    Allergy Status Unknown    Intolerances        Vital Signs Last 24 Hrs  T(C): 36.3 (24 Feb 2021 20:36), Max: 36.3 (24 Feb 2021 20:36)  T(F): 97.4 (24 Feb 2021 20:36), Max: 97.4 (24 Feb 2021 20:36)  HR: 73 (24 Feb 2021 20:36) (73 - 78)  BP: 123/57 (24 Feb 2021 20:36) (123/57 - 134/64)  BP(mean): --  RR: 18 (24 Feb 2021 20:36) (18 - 18)  SpO2: --    PHYSICAL EXAM  General: NAD  HEENT: NCAT, EOMI  Neck: supple  CV: Regular rate and rhythm  Lungs: Clear to ascultation bilaterally, no respiratory distress  Abdomen: soft non-tender non-distended  Ext: No edema  Skin: no rashes and no petechiae  Neuro: baseline dementia sundowning at night      LABS:                          10.7   55.03 )-----------( 144      ( 24 Feb 2021 18:30 )             33.0         Mean Cell Volume : 96.2 fL  Mean Cell Hemoglobin : 31.2 pg  Mean Cell Hemoglobin Concentration : 32.4 g/dL  Auto Neutrophil # : 44.72 K/uL  Auto Lymphocyte # : 1.32 K/uL  Auto Monocyte # : 1.66 K/uL  Auto Eosinophil # : 0.01 K/uL  Auto Basophil # : 0.02 K/uL  Auto Neutrophil % : 81.3 %  Auto Lymphocyte % : 2.4 %  Auto Monocyte % : 3.0 %  Auto Eosinophil % : 0.0 %  Auto Basophil % : 0.0 %      Serial CBC's  02-24 @ 18:30  Hct-33.0 / Hgb-10.7 / Plat-144 / RBC-3.43 / WBC-55.03  Serial CBC's  02-24 @ 05:25  Hct-36.7 / Hgb-11.9 / Plat-160 / RBC-3.76 / WBC-56.57  Serial CBC's  02-23 @ 12:16  Hct-38.5 / Hgb-12.6 / Plat-218 / RBC-4.05 / WBC-16.25      02-24    147<H>  |  100  |  101<HH>  ----------------------------<  100<H>  3.8   |  21  |  3.6<H>    Ca    8.1<L>      24 Feb 2021 05:25    TPro  7.2  /  Alb  3.6  /  TBili  0.4  /  DBili  x   /  AST  36  /  ALT  35  /  AlkPhos  98  02-23                      BLOOD SMEAR INTERPRETATION:       RADIOLOGY & ADDITIONAL STUDIES:    < from: US Kidney and Bladder (02.23.21 @ 17:33) >  IMPRESSION:    No hydronephrosis    < end of copied text >

## 2021-02-25 NOTE — DIETITIAN INITIAL EVALUATION ADULT. - NAME AND PHONE
Nutrition Intervention:meals and snacks,medical food supplements; Nutrition Monitoring:diet order,energy intake,body composition,NFPF, renal/electrolyte profile

## 2021-02-25 NOTE — DIETITIAN INITIAL EVALUATION ADULT. - PHYSCIAL ASSESSMENT
Alert/confused/disoriented to situation, time, place; no edema noted; GI: WDL, LBM unknown at this time; Skin: redness/blanchable per RN flowsheets

## 2021-02-25 NOTE — DIETITIAN INITIAL EVALUATION ADULT. - OTHER CALCULATIONS
using ABW 55.2kg; Energy: 1412-1530kcal (MSJ 1.2-1.3 AF -- appropriate d/t BMI borderline + suspected wt loss); protein: 55-66g/kg (1-1.2g/kg --NIYA on CKD3 noted; Fluid: 1mL/kcal or LIP

## 2021-02-25 NOTE — CONSULT NOTE ADULT - ASSESSMENT
ASSESSMENT  89yMale with a PMH of BPH presenting with E. coli UTI and superimposed E. coli bacteremia, afebrile since admission but worsening leukocytosis         IMPRESSION  # Sepsis on admission ( WBC>12, Bands>10% PLUS NIYA and bacteremia)  # E. coli UTI  # High grade E. coli bacteremia  - worsening leukocytosis 16-->55-->43 over hospital course, neutrophils 80% with left-shift-->ddx infection vs leukemia   - 02/24 and 02/25 BCx still pending, Abx sensitivity still pending    RECOMMENDATIONS  - c/w Meropenem  q12h  - f/u pending culture sensitivities      This is a pended note. All final recommendations to follow pending discussion with ID Attending    ASSESSMENT  89yMale with a PMH of BPH presenting with E. coli UTI and superimposed E. coli bacteremia, afebrile since admission but worsening leukocytosis         IMPRESSION  # Sepsis on admission ( WBC>12, Bands>10% PLUS NIYA and bacteremia)  # E. coli UTI  # High grade E. coli bacteremia  - worsening leukocytosis 16-->55-->43 over hospital course, neutrophils 80% with left-shift-->ddx infection vs leukemia   - 02/24 and 02/25 BCx still pending, Abx sensitivity still pending  - PCR identification-->E coli, beta-lactam sensitive  RECOMMENDATIONS  - d/c Meropenem  - Ceftriaxone 2g Q24h  -  f/u for further evulation      This is a pended note. All final recommendations to follow pending discussion with ID Attending    ASSESSMENT  89yMale with a PMH of BPH presenting with E. coli UTI and superimposed E. coli bacteremia, afebrile since admission but worsening leukocytosis         IMPRESSION  # Sepsis on admission ( WBC>12, Bands>10% PLUS NIYA and bacteremia)  # E. coli UTI  # High grade E. coli bacteremia  - worsening leukocytosis 16-->55-->43 over hospital course, neutrophils 80% with left-shift-->ddx infection vs leukemia   - 02/24 and 02/25 BCx still pending, Abx sensitivity still pending  - PCR identification-->E coli, beta-lactam sensitive  RECOMMENDATIONS  - d/c Meropenem  - Ceftriaxone 2g Q24h  - microscopic hematuria, CKD--> f/u for further evaluation possible bladder malignancy      This is a pended note. All final recommendations to follow pending discussion with ID Attending    ASSESSMENT  89yMale with a PMH of BPH presenting with E. coli UTI and superimposed E. coli bacteremia, afebrile since admission but worsening leukocytosis         IMPRESSION  # Sepsis on admission ( WBC>12, Bands>10% PLUS NIYA and bacteremia) secondary to acute pelonephritis secondary to E. coli  BCx 2/23 E coli  UCx 2/23 E coli   Renal US no hydronephrosis  ARF    RECOMMENDATIONS  d/c Meropenem  Ceftriaxone 2g Q24h  microscopic hematuria, CKD--> f/u for further evaluation possible bladder malignancy

## 2021-02-25 NOTE — CONSULT NOTE ADULT - SUBJECTIVE AND OBJECTIVE BOX
MIGUEL ÁNGEL STANFORD  89y, Male  Allergy: Allergy Status Unknown    CHIEF COMPLAINT: dysuria (2021 06:44)      HPI:  89y M PMD BPH presents for eval of fever. Pt developed fever this week with associated n/v, relieved with Tylenol, no aggravating factors. Patient reports weakness, fatigue and dysuria and poor PO intake for last few days. Also some chronic difficulty to urinate. A&Ox2 which per daughter is baseline.   Per daughter he lost 30-40 pounds in last few months and has been bleeding rectally.  He denied other symptoms, including chest pain, SOB, abdominal pain, back pain.    ED course: T(F): 98.3, HR: 69, BP: 175/74. SpO2: 98% on RA. WBC 16.25, Cr 2.3, 104 WBCs in urine. Urinalysis returned positive for infection. Ptn diagnosed with UTI and started on IV Ceftriaxone, however E. coli detected on blood cultures and ptn switched to Meropenem IV Abx for superimposed E.coli bacteremia    ID consulted on hospital day 2 for worsening leukocytosis    Infectious Diseases History:  Old Micro Data/Cultures:     FAMILY HISTORY:  No pertinent family history in first degree relatives      PAST MEDICAL & SURGICAL HISTORY:  BPH (benign prostatic hyperplasia)    No significant past surgical history    SOCIAL HISTORY  Social History:  Denies x3 (2021 15:13)      Recent Travel:  Other Exposures:     ROS  General: Denies rigors, nightsweats  HEENT: Denies headache, rhinorrhea, sore throat, eye pain  CV: Denies CP, palpitations  PULM: Denies wheezing, hemoptysis  GI: Denies hematemesis, hematochezia, melena  : Denies discharge, hematuria  MSK: Denies arthralgias, myalgias  SKIN: Denies rash, lesions  NEURO: Denies paresthesias, weakness  PSYCH: Denies depression, anxiety    VITALS:  T(F): 97.4, Max: 97.4 (21 @ 20:36)  HR: 73  BP: 123/57  RR: 18Vital Signs Last 24 Hrs  T(C): 36.3 (2021 20:36), Max: 36.3 (2021 20:36)  T(F): 97.4 (2021 20:36), Max: 97.4 (2021 20:36)  HR: 73 (2021 20:36) (73 - 78)  BP: 123/57 (2021 20:36) (123/57 - 134/64)  BP(mean): --  RR: 18 (2021 20:36) (18 - 18)  SpO2: --    PHYSICAL EXAM:  Gen: NAD, elderly male resting in bed  HEENT: Normocephalic, atraumatic  Neck: supple, no lymphadenopathy  CV: Regular rate & regular rhythm  Lungs: CTAB  Abdomen: Soft, BS present  Ext: Warm, well perfused  Neuro: non focal, awake. AOx2  Skin: no rash, no lesions  Lines: no phlebitis    TESTS & MEASUREMENTS:                        11.8   43.11 )-----------( 147      ( 2021 05:50 )             36.2         145  |  102  |  97<HH>  ----------------------------<  72  3.2<L>   |  23  |  2.9<H>    Ca    8.4<L>      2021 05:50    TPro  6.4  /  Alb  3.1<L>  /  TBili  0.4  /  DBili  x   /  AST  117<H>  /  ALT  56<H>  /  AlkPhos  139<H>      eGFR if Non African American: 18 mL/min/1.73M2 (21 @ 05:50)  eGFR if African American: 21 mL/min/1.73M2 (21 @ 05:50)    LIVER FUNCTIONS - ( 2021 05:50 )  Alb: 3.1 g/dL / Pro: 6.4 g/dL / ALK PHOS: 139 U/L / ALT: 56 U/L / AST: 117 U/L / GGT: x           Urinalysis Basic - ( 2021 12:16 )    Color: Yellow / Appearance: Slightly Turbid / S.018 / pH: x  Gluc: x / Ketone: Negative  / Bili: Negative / Urobili: <2 mg/dL   Blood: x / Protein: 30 mg/dL / Nitrite: Negative   Leuk Esterase: Large / RBC: 114 /HPF /  /HPF   Sq Epi: x / Non Sq Epi: 0 /HPF / Bacteria: Many    Culture - Urine (collected 21 @ 12:16)  Source: .Urine Clean Catch (Midstream)  Preliminary Report (21 @ 02:47):    >100,000 CFU/ml Escherichia coli    Culture - Blood (collected 21 @ 12:16)  Source: .Blood Blood-Peripheral  Gram Stain (21 @ 09:04):    Growth in aerobic bottle: Gram Negative Rods    Growth in anaerobic bottle: Gram Negative Rods  Preliminary Report (21 @ 09:05):    Growth in aerobic bottle: Gram Negative Rods    Growth in anaerobic bottle: Gram Negative Rods    Culture - Blood (collected 21 @ 12:08)  Source: .Blood Blood-Peripheral  Gram Stain (21 @ 06:14):    Growth in aerobic and anaerobic bottles: Gram Negative Rods  Preliminary Report (21 @ 09:50):    Growth in aerobic and anaerobic bottles: Escherichia coli    ***Blood Panel PCR results on this specimen are available    approximately 3 hours after the Gram stain result.***    Gram stain, PCR, and/or culture results may not always    correspond due to difference in methodologies.    ************************************************************    This PCR assay was performed by multiplex PCR. This    Assay tests for 66 bacterial and resistance gene targets.    Please refer to the [facility] test directory    at [email or web address] for details.  Organism: Blood Culture PCR (21 @ 08:07)  Organism: Blood Culture PCR (21 @ 08:07)      -  Escherichia coli: Detec      Method Type: PCR      Lactate, Blood: 1.3 mmol/L (21 @ 21:05)  Lactate, Blood: 1.1 mmol/L (21 @ 12:16)      INFECTIOUS DISEASES TESTING      RADIOLOGY & ADDITIONAL TESTS:  I have personally reviewed the last available Chest xray  CXR< from: Xray Chest 1 View-PORTABLE IMMEDIATE (02.23.21 @ 13:14) >  EXAM:  XR CHEST PORTABLE IMMED 1V            PROCEDURE DATE:  2021            INTERPRETATION:  Clinical History / Reason for exam: Sepsis    Comparison : Chest radiograph None.    Technique/Positioning: Portable AP chest.    Findings:    Support devices: None.    Cardiac/mediastinum/hilum: Heart size within normal limits. The pulmonary vascular markings within normal limits.    Lung parenchyma/Pleura: Lungs are clear of infiltrate. Costophrenic angles are clear. No pneumothorax.    Skeleton/soft tissues: Degenerative changes of the spine are noted.    Impression:    No radiographic evidence of acute cardiopulmonary disease.      CARDIOLOGY TESTING  12 Lead ECG:   Ventricular Rate 65 BPM    Atrial Rate 65 BPM    P-R Interval 132 ms    QRS Duration 128 ms    Q-T Interval 448 ms    QTC Calculation(Bazett) 465 ms    P Axis 73 degrees    R Axis 84 degrees    T Axis 74 degrees    Diagnosis Line Normal sinus rhythm  Right bundle branch block  Abnormal ECG    All available historical records have been reviewed    MEDICATIONS  chlorhexidine 4% Liquid 1  finasteride 5  heparin   Injectable 5000  lactated ringers. 1000  meropenem  IVPB 500  tamsulosin 0.4      ANTIBIOTICS:  meropenem  IVPB 500 milliGRAM(s) IV Intermittent every 12 hours    All available historical data has been reviewed

## 2021-02-25 NOTE — DIETITIAN INITIAL EVALUATION ADULT. - ADD RECOMMEND
1. change diet to regular GI soft w/ mechanical soft. 2. Add Ensure Enlive q24h (vanilla) and add ensure pudding BID. 3. provide max encouragement and assistance w/ feeds. pt @ risk, RD to f/u in 4 days.

## 2021-02-26 LAB
-  AMIKACIN: SIGNIFICANT CHANGE UP
-  AMOXICILLIN/CLAVULANIC ACID: SIGNIFICANT CHANGE UP
-  AMPICILLIN/SULBACTAM: SIGNIFICANT CHANGE UP
-  AMPICILLIN: SIGNIFICANT CHANGE UP
-  AZTREONAM: SIGNIFICANT CHANGE UP
-  CEFAZOLIN: SIGNIFICANT CHANGE UP
-  CEFEPIME: SIGNIFICANT CHANGE UP
-  CEFOXITIN: SIGNIFICANT CHANGE UP
-  CEFTRIAXONE: SIGNIFICANT CHANGE UP
-  CIPROFLOXACIN: SIGNIFICANT CHANGE UP
-  ERTAPENEM: SIGNIFICANT CHANGE UP
-  GENTAMICIN: SIGNIFICANT CHANGE UP
-  IMIPENEM: SIGNIFICANT CHANGE UP
-  LEVOFLOXACIN: SIGNIFICANT CHANGE UP
-  MEROPENEM: SIGNIFICANT CHANGE UP
-  NITROFURANTOIN: SIGNIFICANT CHANGE UP
-  PIPERACILLIN/TAZOBACTAM: SIGNIFICANT CHANGE UP
-  TIGECYCLINE: SIGNIFICANT CHANGE UP
-  TOBRAMYCIN: SIGNIFICANT CHANGE UP
-  TRIMETHOPRIM/SULFAMETHOXAZOLE: SIGNIFICANT CHANGE UP
ALBUMIN SERPL ELPH-MCNC: 2.8 G/DL — LOW (ref 3.5–5.2)
ALP SERPL-CCNC: 158 U/L — HIGH (ref 30–115)
ALT FLD-CCNC: 46 U/L — HIGH (ref 0–41)
ANION GAP SERPL CALC-SCNC: 13 MMOL/L — SIGNIFICANT CHANGE UP (ref 7–14)
AST SERPL-CCNC: 59 U/L — HIGH (ref 0–41)
BASOPHILS # BLD AUTO: 0.04 K/UL — SIGNIFICANT CHANGE UP (ref 0–0.2)
BASOPHILS NFR BLD AUTO: 0.2 % — SIGNIFICANT CHANGE UP (ref 0–1)
BILIRUB SERPL-MCNC: 0.3 MG/DL — SIGNIFICANT CHANGE UP (ref 0.2–1.2)
BUN SERPL-MCNC: 84 MG/DL — CRITICAL HIGH (ref 10–20)
CALCIUM SERPL-MCNC: 8 MG/DL — LOW (ref 8.5–10.1)
CHLORIDE SERPL-SCNC: 108 MMOL/L — SIGNIFICANT CHANGE UP (ref 98–110)
CO2 SERPL-SCNC: 27 MMOL/L — SIGNIFICANT CHANGE UP (ref 17–32)
CREAT SERPL-MCNC: 1.9 MG/DL — HIGH (ref 0.7–1.5)
CULTURE RESULTS: SIGNIFICANT CHANGE UP
EOSINOPHIL # BLD AUTO: 0.21 K/UL — SIGNIFICANT CHANGE UP (ref 0–0.7)
EOSINOPHIL NFR BLD AUTO: 1 % — SIGNIFICANT CHANGE UP (ref 0–8)
FOLATE SERPL-MCNC: 4.6 NG/ML — LOW
GLUCOSE SERPL-MCNC: 126 MG/DL — HIGH (ref 70–99)
HCT VFR BLD CALC: 33.9 % — LOW (ref 42–52)
HGB BLD-MCNC: 11.1 G/DL — LOW (ref 14–18)
IMM GRANULOCYTES NFR BLD AUTO: 5.9 % — HIGH (ref 0.1–0.3)
LACTATE SERPL-SCNC: 1.3 MMOL/L — SIGNIFICANT CHANGE UP (ref 0.7–2)
LDH SERPL L TO P-CCNC: 370 U/L — HIGH (ref 50–242)
LYMPHOCYTES # BLD AUTO: 1.13 K/UL — LOW (ref 1.2–3.4)
LYMPHOCYTES # BLD AUTO: 5.3 % — LOW (ref 20.5–51.1)
MAGNESIUM SERPL-MCNC: 2.5 MG/DL — HIGH (ref 1.8–2.4)
MCHC RBC-ENTMCNC: 31.3 PG — HIGH (ref 27–31)
MCHC RBC-ENTMCNC: 32.7 G/DL — SIGNIFICANT CHANGE UP (ref 32–37)
MCV RBC AUTO: 95.5 FL — HIGH (ref 80–94)
METHOD TYPE: SIGNIFICANT CHANGE UP
MONOCYTES # BLD AUTO: 0.72 K/UL — HIGH (ref 0.1–0.6)
MONOCYTES NFR BLD AUTO: 3.4 % — SIGNIFICANT CHANGE UP (ref 1.7–9.3)
NEUTROPHILS # BLD AUTO: 18.12 K/UL — HIGH (ref 1.4–6.5)
NEUTROPHILS NFR BLD AUTO: 84.2 % — HIGH (ref 42.2–75.2)
NRBC # BLD: 0 /100 WBCS — SIGNIFICANT CHANGE UP (ref 0–0)
ORGANISM # SPEC MICROSCOPIC CNT: SIGNIFICANT CHANGE UP
PLATELET # BLD AUTO: 164 K/UL — SIGNIFICANT CHANGE UP (ref 130–400)
POTASSIUM SERPL-MCNC: 3.7 MMOL/L — SIGNIFICANT CHANGE UP (ref 3.5–5)
POTASSIUM SERPL-SCNC: 3.7 MMOL/L — SIGNIFICANT CHANGE UP (ref 3.5–5)
PROT SERPL-MCNC: 5.6 G/DL — LOW (ref 6–8)
RBC # BLD: 3.55 M/UL — LOW (ref 4.7–6.1)
RBC # FLD: 12.1 % — SIGNIFICANT CHANGE UP (ref 11.5–14.5)
SODIUM SERPL-SCNC: 148 MMOL/L — HIGH (ref 135–146)
SPECIMEN SOURCE: SIGNIFICANT CHANGE UP
VIT B12 SERPL-MCNC: 973 PG/ML — SIGNIFICANT CHANGE UP (ref 232–1245)
WBC # BLD: 21.49 K/UL — HIGH (ref 4.8–10.8)
WBC # FLD AUTO: 21.49 K/UL — HIGH (ref 4.8–10.8)

## 2021-02-26 PROCEDURE — 76705 ECHO EXAM OF ABDOMEN: CPT | Mod: 26

## 2021-02-26 PROCEDURE — 99232 SBSQ HOSP IP/OBS MODERATE 35: CPT

## 2021-02-26 PROCEDURE — 99233 SBSQ HOSP IP/OBS HIGH 50: CPT

## 2021-02-26 RX ORDER — SODIUM CHLORIDE 9 MG/ML
1000 INJECTION, SOLUTION INTRAVENOUS
Refills: 0 | Status: DISCONTINUED | OUTPATIENT
Start: 2021-02-26 | End: 2021-02-28

## 2021-02-26 RX ORDER — FOLIC ACID 0.8 MG
1 TABLET ORAL DAILY
Refills: 0 | Status: DISCONTINUED | OUTPATIENT
Start: 2021-02-26 | End: 2021-03-04

## 2021-02-26 RX ORDER — SALICYLIC ACID 0.5 %
1 CLEANSER (GRAM) TOPICAL
Refills: 0 | Status: DISCONTINUED | OUTPATIENT
Start: 2021-02-26 | End: 2021-03-04

## 2021-02-26 RX ORDER — AMLODIPINE BESYLATE 2.5 MG/1
5 TABLET ORAL ONCE
Refills: 0 | Status: COMPLETED | OUTPATIENT
Start: 2021-02-26 | End: 2021-02-26

## 2021-02-26 RX ADMIN — TAMSULOSIN HYDROCHLORIDE 0.4 MILLIGRAM(S): 0.4 CAPSULE ORAL at 22:16

## 2021-02-26 RX ADMIN — CEFTRIAXONE 100 MILLIGRAM(S): 500 INJECTION, POWDER, FOR SOLUTION INTRAMUSCULAR; INTRAVENOUS at 18:22

## 2021-02-26 RX ADMIN — HEPARIN SODIUM 5000 UNIT(S): 5000 INJECTION INTRAVENOUS; SUBCUTANEOUS at 12:11

## 2021-02-26 RX ADMIN — FINASTERIDE 5 MILLIGRAM(S): 5 TABLET, FILM COATED ORAL at 11:40

## 2021-02-26 RX ADMIN — Medication 1 MILLIGRAM(S): at 18:54

## 2021-02-26 RX ADMIN — Medication 1 APPLICATION(S): at 18:25

## 2021-02-26 RX ADMIN — AMLODIPINE BESYLATE 5 MILLIGRAM(S): 2.5 TABLET ORAL at 22:16

## 2021-02-26 RX ADMIN — HEPARIN SODIUM 5000 UNIT(S): 5000 INJECTION INTRAVENOUS; SUBCUTANEOUS at 22:16

## 2021-02-26 RX ADMIN — HEPARIN SODIUM 5000 UNIT(S): 5000 INJECTION INTRAVENOUS; SUBCUTANEOUS at 05:55

## 2021-02-26 NOTE — SWALLOW BEDSIDE ASSESSMENT ADULT - SWALLOW EVAL: DIAGNOSIS
+toleration for puree and thin liquids w/o overt s/s aspiration/penetration; mod oral dysphagia for mechanical soft consistency

## 2021-02-26 NOTE — PROGRESS NOTE ADULT - SUBJECTIVE AND OBJECTIVE BOX
24H events:    Patient is a 89y old Male who presents with a chief complaint of UTI (26 Feb 2021 13:35)    Primary diagnosis of UTI (urinary tract infection)       Today is hospital day 3d.     PAST MEDICAL & SURGICAL HISTORY  BPH (benign prostatic hyperplasia)    No significant past surgical history      SOCIAL HISTORY:  Negative for smoking/alcohol/drug use.     ALLERGIES:  Allergy Status Unknown    MEDICATIONS:  STANDING MEDICATIONS  cefTRIAXone   IVPB 2000 milliGRAM(s) IV Intermittent every 24 hours  chlorhexidine 4% Liquid 1 Application(s) Topical <User Schedule>  dextrose 5% + sodium chloride 0.45%. 1000 milliLiter(s) IV Continuous <Continuous>  finasteride 5 milliGRAM(s) Oral daily  folic acid 1 milliGRAM(s) Oral daily  heparin   Injectable 5000 Unit(s) SubCutaneous every 8 hours  tamsulosin 0.4 milliGRAM(s) Oral at bedtime    PRN MEDICATIONS  acetaminophen  Suppository .. 650 milliGRAM(s) Rectal every 6 hours PRN    VITALS:   T(F): 97.7  HR: 72  BP: 150/78  RR: 20  SpO2: --    LABS:                        11.1   21.49 )-----------( 164      ( 26 Feb 2021 05:10 )             33.9     02-26    148<H>  |  108  |  84<HH>  ----------------------------<  126<H>  3.7   |  27  |  1.9<H>    Ca    8.0<L>      26 Feb 2021 05:10  Mg     2.5     02-26    TPro  5.6<L>  /  Alb  2.8<L>  /  TBili  0.3  /  DBili  x   /  AST  59<H>  /  ALT  46<H>  /  AlkPhos  158<H>  02-26          Lactate, Blood: 1.3 mmol/L (02-26-21 @ 05:10)      Culture - Blood (collected 25 Feb 2021 05:50)  Source: .Blood None  Preliminary Report (26 Feb 2021 13:02):    No growth to date.    Culture - Blood (collected 25 Feb 2021 05:50)  Source: .Blood None  Preliminary Report (26 Feb 2021 13:02):    No growth to date.    Culture - Blood (collected 24 Feb 2021 11:14)  Source: .Blood None  Preliminary Report (25 Feb 2021 18:02):    No growth to date.    Culture - Blood (collected 24 Feb 2021 11:10)  Source: .Blood None  Preliminary Report (25 Feb 2021 18:02):    No growth to date.          RADIOLOGY:    < from: US Kidney and Bladder (02.23.21 @ 17:33) >  Urinary bladder: No debris or calculus. Bilateral ureteral jets are visualized. Bladder volume is 40 cc, nondistended.    IMPRESSION:    No hydronephrosis    < end of copied text >       24H events:    No acute events overnight  Leukocytosis improving    PAST MEDICAL & SURGICAL HISTORY  BPH (benign prostatic hyperplasia)    No significant past surgical history      SOCIAL HISTORY:  Negative for smoking/alcohol/drug use.     ALLERGIES:  Allergy Status Unknown    MEDICATIONS:  STANDING MEDICATIONS  cefTRIAXone   IVPB 2000 milliGRAM(s) IV Intermittent every 24 hours  chlorhexidine 4% Liquid 1 Application(s) Topical <User Schedule>  dextrose 5% + sodium chloride 0.45%. 1000 milliLiter(s) IV Continuous <Continuous>  finasteride 5 milliGRAM(s) Oral daily  folic acid 1 milliGRAM(s) Oral daily  heparin   Injectable 5000 Unit(s) SubCutaneous every 8 hours  tamsulosin 0.4 milliGRAM(s) Oral at bedtime    PRN MEDICATIONS  acetaminophen  Suppository .. 650 milliGRAM(s) Rectal every 6 hours PRN    VITALS:   T(F): 97.7  HR: 72  BP: 150/78  RR: 20  SpO2: --    LABS:                        11.1   21.49 )-----------( 164      ( 26 Feb 2021 05:10 )             33.9     02-26    148<H>  |  108  |  84<HH>  ----------------------------<  126<H>  3.7   |  27  |  1.9<H>    Ca    8.0<L>      26 Feb 2021 05:10  Mg     2.5     02-26    TPro  5.6<L>  /  Alb  2.8<L>  /  TBili  0.3  /  DBili  x   /  AST  59<H>  /  ALT  46<H>  /  AlkPhos  158<H>  02-26          Lactate, Blood: 1.3 mmol/L (02-26-21 @ 05:10)      Culture - Blood (collected 25 Feb 2021 05:50)  Source: .Blood None  Preliminary Report (26 Feb 2021 13:02):    No growth to date.    Culture - Blood (collected 25 Feb 2021 05:50)  Source: .Blood None  Preliminary Report (26 Feb 2021 13:02):    No growth to date.    Culture - Blood (collected 24 Feb 2021 11:14)  Source: .Blood None  Preliminary Report (25 Feb 2021 18:02):    No growth to date.    Culture - Blood (collected 24 Feb 2021 11:10)  Source: .Blood None  Preliminary Report (25 Feb 2021 18:02):    No growth to date.          RADIOLOGY:    < from: US Kidney and Bladder (02.23.21 @ 17:33) >  Urinary bladder: No debris or calculus. Bilateral ureteral jets are visualized. Bladder volume is 40 cc, nondistended.    IMPRESSION:    No hydronephrosis    < end of copied text >      Physical Exam:  General: NAD  HENT: NCAT  CHest: No respiratory distress  Heart: Normal rate  Extremities: No edema

## 2021-02-26 NOTE — PROGRESS NOTE ADULT - ASSESSMENT
89 year old male with PMHx of BPH presents for fever and found to have gram negative bacteremia, also has had 3o lb weight loss over several months, heme consulted for leukocytosis.    Leukocytosis: Leukocytosis is improving, most likely reactive due to infection  - Please send flow cytometry and BCR-ABL, form provided  - Continue treat underlying infection, currently on 2g ceftriaxone fur UTI  - Treat underlying infection (currently on ceftriaxone for e. coli in urine and likely in blood)  - Monitor CBC 89 year old male with PMHx of BPH presents for fever and found to have gram negative bacteremia, also has had 3o lb weight loss over several months, heme consulted for leukocytosis.    Leukocytosis: Leukocytosis is improving, most likely reactive due to infection  - Smear reviewed, bands and toxic granulation seen as well as normal appearing neutrophils, no eosinophils, no blasts seen   - WBC 21.5 today, down from >55  - Please send flow cytometry and BCR-ABL, form provided  - Continue treat underlying infection, currently on 2g ceftriaxone fur UTI and bacteremia  - Monitor CBC  - F/u with Dr. Dia 3/16 at 1:15

## 2021-02-26 NOTE — PROGRESS NOTE ADULT - SUBJECTIVE AND OBJECTIVE BOX
Nephrology progress note    Patient is seen and examined, events over the last 24 h noted .    Allergies:  Allergy Status Unknown    Hospital Medications:   MEDICATIONS  (STANDING):  cefTRIAXone   IVPB 2000 milliGRAM(s) IV Intermittent every 24 hours  chlorhexidine 4% Liquid 1 Application(s) Topical <User Schedule>  dextrose 5% + sodium chloride 0.45%. 1000 milliLiter(s) (75 mL/Hr) IV Continuous <Continuous>  finasteride 5 milliGRAM(s) Oral daily  heparin   Injectable 5000 Unit(s) SubCutaneous every 8 hours  tamsulosin 0.4 milliGRAM(s) Oral at bedtime        VITALS:  T(F): 97 (21 @ 05:10), Max: 97.4 (21 @ 14:13)  HR: 77 (21 @ 05:10)  BP: 132/66 (21 @ 05:10)  RR: 20 (21 @ 05:10)  SpO2: --  Wt(kg): --     @ 07:01  -   @ 07:00  --------------------------------------------------------  IN: 950 mL / OUT: 0 mL / NET: 950 mL     @ 07:01  -   @ 07:00  --------------------------------------------------------  IN: 1140 mL / OUT: 2 mL / NET: 1138 mL     @ 07:01  -   @ 13:36  --------------------------------------------------------  IN: 240 mL / OUT: 0 mL / NET: 240 mL          PHYSICAL EXAM:  Constitutional: NAD  HEENT: anicteric sclera, dry mucous membranes  Neck: No JVD  Respiratory: CTA  Cardiovascular: S1, S2, RRR  Gastrointestinal: BS+, soft, NT/ND  Extremities: No peripheral edema  Neurological: confused  : No CVA tenderness. No erazo.   Skin: No rashes  Vascular Access:    LABS:      148<H>  |  108  |  84<HH>  ----------------------------<  126<H>  3.7   |  27  |  1.9<H>    Ca    8.0<L>      2021 05:10  Mg     2.5         TPro  5.6<L>  /  Alb  2.8<L>  /  TBili  0.3  /  DBili      /  AST  59<H>  /  ALT  46<H>  /  AlkPhos  158<H>                            11.1   21.49 )-----------( 164      ( 2021 05:10 )             33.9       Urine Studies:  Urinalysis Basic - ( 2021 12:16 )    Color: Yellow / Appearance: Slightly Turbid / S.018 / pH:   Gluc:  / Ketone: Negative  / Bili: Negative / Urobili: <2 mg/dL   Blood:  / Protein: 30 mg/dL / Nitrite: Negative   Leuk Esterase: Large / RBC: 114 /HPF /  /HPF   Sq Epi:  / Non Sq Epi: 0 /HPF / Bacteria: Many      Sodium, Random Urine: 43.0 mmoL/L ( @ 06:00)  Creatinine, Random Urine: 43 mg/dL ( @ 06:00)    RADIOLOGY & ADDITIONAL STUDIES:

## 2021-02-26 NOTE — SWALLOW BEDSIDE ASSESSMENT ADULT - MUCOSAL QUALITY
No open wounds or ulcerations noted on mucosa. Dried yellowish bumps noted on L-side of tongue. PA aware.

## 2021-02-26 NOTE — PROGRESS NOTE ADULT - ASSESSMENT
Patient is a 89y old  Male who presents with a chief complaint of UTI. Renal was consulted for NIYA with creatinine of 3.6 on CKD.    #UTI w/ ecoli bacteremia   #NIYA on CKD likely secondary to hypovolemia    - cr 1.2 (baseline) > 2.3 >3.6> 2.9 ->1.9 (today)  - check U lytes, serum Mg and phosphorus   - Strict I&O's  - replete K and serial BMP  - US K&B (2/23/21) > unremarkable   - WBC 16 (admission) > 56 > 43 ->21(today), monitor CBC  pt s kidney function is improving, will sign off  call as needed

## 2021-02-26 NOTE — CHART NOTE - NSCHARTNOTEFT_GEN_A_CORE
89 yo M with hx BPH, dementia, CKD, admitted on 2/23/21 with NIYA and pyelonephritis with E coli bacteremia ( + blood cultures ESBL + E coli from 2/23/21).  His WBC increased dramatically from 16,000  to 56,000 between 2/23 and 2/24; he is being worked up to r/o AML/CML. He was seen by ID and Hem/Onc,  and is being treated with iv rocephin. Flow cytometry was drawn today.  His WBC is still elevated but improving = 21,000 today; NIYA is also improving with IV fluids. He is being followed by Renal.  He was being hydrated with LR @ 75cc/hr but Na+ has increased to 148 today. His BP is good = 150/78 this evening; discussed with MD and changed IV fluids  to D51/2NS @ 75cc/hr.    The patient was made DNR/DNI; MOLST form is in the chart.    The patient was seen by SLP today; there was dried food stuck in his mouth and she cleaned his oral cavity and tongue thoroughly.  His lips are very dry and chapped, will order A&D ointment twice a day to relieve this.\  Oral care was ordered after meals and at bedtime.  He was placed on a puree diet with thin liquids due to weakness and no bottom teeth. He prefers Ensure and pudding, as per his daughter, so these were ordered for him, too.    Spoke to the patient's daughter, Rajeev, today, and updated her on her dad's condition and answered all questions to her satisfaction.    Continue to monitor labs and VS  1:1 supervised meals as per SLP  Continue IV ABX as per ID (7 days of iv rocephin from 2/25, then change to po amoxicillin 500mg q8h for another 7 days)  Check urine lytes, follow BMP, Mg++, PO4, CBC 89 yo M with hx BPH, dementia, CKD, admitted on 2/23/21 with NIYA and pyelonephritis with E coli bacteremia ( + blood cultures ESBL + E coli from 2/23/21).  His WBC increased dramatically from 16,000  to 56,000 between 2/23 and 2/24; he is being worked up to r/o AML/CML. He was seen by ID and Hem/Onc,  and is being treated with iv rocephin. Flow cytometry was drawn today.  His WBC is still elevated but improving = 21,000 today; NIYA is also improving with IV fluids. He is being followed by Renal.  He was being hydrated with LR @ 75cc/hr but Na+ has increased to 148 today. His BP is good = 150/78 this evening; discussed with MD and changed IV fluids  to D51/2NS @ 75cc/hr.    The patient was made DNR/DNI; MOLST form is in the chart.    The patient was seen by SLP today; there was dried food stuck in his mouth and she cleaned his oral cavity and tongue thoroughly.  His lips are very dry and chapped, will order A&D ointment twice a day to relieve this.\  Oral care was ordered after meals and at bedtime.  He was placed on a puree diet with thin liquids due to weakness and no bottom teeth. He prefers Ensure and pudding, as per his daughter, so these were ordered for him, too.    Spoke to the patient's daughter, Rajeev, today, and updated her on her dad's condition and answered all questions to her satisfaction.    -- Continue to monitor labs and VS--check CBC with diff, BMP, LFT's, MG++, PO4 daily  -- 1:1 supervised meals as per SLP  -- Continue IV ABX as per ID (7 days of iv rocephin from 2/25, then change to po amoxicillin 500mg q8h for another 7 days)  -- Check urine lytes  -- patient was reported to have diarrhea last night, could be due to ABX but he was also supplemented with po KCL 20meq x2 doses--no LBM so far today, but   if diarrhea should recur, will send stool for C diff 89 yo M with hx BPH, dementia, CKD, admitted on 2/23/21 with NIYA and pyelonephritis with E coli bacteremia ( + blood cultures ESBL + E coli from 2/23/21).  His WBC increased dramatically from 16,000  to 56,000 between 2/23 and 2/24; he is being worked up to r/o AML/CML. He was seen by ID and Hem/Onc,  and is being treated with iv rocephin. Flow cytometry was drawn today.  His WBC is still elevated but improving = 21,000 today; NIYA is also improving with IV fluids. He is being followed by Renal.  He was being hydrated with LR @ 75cc/hr but Na+ has increased to 148 today. His BP is good = 150/78 this evening; discussed with MD and changed IV fluids  to D51/2NS @ 75cc/hr.    The patient was made DNR/DNI; MOLST form is in the chart.    The patient was seen by SLP today; there was dried food stuck in his mouth and she cleaned his oral cavity and tongue thoroughly.  His lips are very dry and chapped, will order A&D ointment twice a day to relieve this.\  Oral care was ordered after meals and at bedtime.  He was placed on a puree diet with thin liquids due to weakness and no bottom teeth. He prefers Ensure and pudding, as per his daughter, so these were ordered for him, too.    Spoke to the patient's daughter, Rajeev, today, and updated her on her dad's condition and answered all questions to her satisfaction.    -- Continue to monitor labs and VS--check CBC with diff, BMP, LFT's, MG++, PO4 daily  -- 1:1 supervised meals as per SLP  -- Continue IV ABX as per ID (7 days of iv rocephin from 2/25, then change to po amoxicillin 500mg q8h for another 7 days)  -- Check urine lytes  -- patient was reported to have diarrhea last night, could be due to ABX but he was also supplemented with po KCL 20meq x2 doses--no LBM so far today, but   if diarrhea should recur, will send stool for C diff  -- urine cytology was ordered, as per ID recommendation, to r/o bladder malignancy (microscopic hematuria on UA--could be due to the UTI; US renal /bladder was negative on 2/23, also  ordered another UA)

## 2021-02-26 NOTE — SWALLOW BEDSIDE ASSESSMENT ADULT - SLP PERTINENT HISTORY OF CURRENT PROBLEM
pt is a 89 y/o M w/ PMHx: BPH admitted w/ fever a/w nausea, vomiting, weakness, fatigue, dysuria and poor PO intake. Per daughter, pt w/ ~30-40 lb weight loss in the last few months. pt being treated for sepsis on admission secondary to acute pyelonephritis secondary to E. coli. pt w/ ulcerations - dried and fresh blood on lower lip. SLP c/s to assess for modification of current diet. pt is a 89 y/o M w/ PMHx: BPH admitted w/ fever a/w nausea, vomiting, weakness, fatigue, dysuria and poor PO intake. Per daughter, pt w/ ~30-40 lb weight loss in the last few months. pt being treated for sepsis on admission secondary to acute pyelonephritis secondary to E. coli. pt w/ ulcerations - dried and fresh blood on lower lip. CXR (-); SLP c/s to assess for modification of current diet/poor PO intake. pt also seen by nutrition.

## 2021-02-26 NOTE — PROGRESS NOTE ADULT - ASSESSMENT
ASSESSMENT  89yMale with a PMH of BPH presenting with E. coli UTI and superimposed E. coli bacteremia, afebrile since admission but worsening leukocytosis         IMPRESSION  Resolved sepsis on admission secondary to acute pyelonephritis secondary to E. coli  decreasing WBC  BCx 2/23 E coli  UCx 2/23 E coli  Renal US no hydronephrosis  ARF: improving    RECOMMENDATIONS  Ceftriaxone 2g Q24h  microscopic hematuria, CKD--> f/u for further evaluation possible bladder malignancy  Midline and 7 days of iv Rocephin 1 gm q24h starting 2/25 then change iv to po Amoxicillin 500 mg q8h for 7 more days  recall prn please

## 2021-02-26 NOTE — PROGRESS NOTE ADULT - SUBJECTIVE AND OBJECTIVE BOX
pt seen and examined.   pt is awake and alert   HPI:  89y M PMD BPH presents for eval of fever. Pt developed fever this week with associated n/v, relieved with Tylenol, no aggravating factors. Patient reports weakness, fatigue and dysuria and poor PO intake for last few days. Also some chronic difficulty to urinate. A&Ox2 which per daughter is baseline.   Per daughter he lost 30-40 pounds in last few months and has been bleeding rectally.  He denies other symptoms, including chest pain, SOB, abdominal pain, back pain.        Vital Signs Last 24 Hrs  T(C): 36.1 (26 Feb 2021 05:10), Max: 36.3 (25 Feb 2021 14:13)  T(F): 97 (26 Feb 2021 05:10), Max: 97.4 (25 Feb 2021 14:13)  HR: 77 (26 Feb 2021 05:10) (72 - 80)  BP: 132/66 (26 Feb 2021 05:10) (127/61 - 152/68)  BP(mean): --  RR: 20 (26 Feb 2021 05:10) (18 - 20)  SpO2: --  Daily     Daily   I&O's Summary    25 Feb 2021 07:01  -  26 Feb 2021 07:00  --------------------------------------------------------  IN: 900 mL / OUT: 2 mL / NET: 898 mL        Physical exam:   constitutional No resp distress, lips have dried blood and ulceration. ( dw the physician who saw him yesterday, he had the same swelling yesterday) pt looks very tired. AAOX2, Respiratory  lungs CTA, CVS heart RRR, GI: abdomen Soft NT, ND, BS+,  neuro exam non focal. gen weakness                          11.1   21.49 )-----------( 164      ( 26 Feb 2021 05:10 )             33.9     02-26    148<H>  |  108  |  84<HH>  ----------------------------<  126<H>  3.7   |  27  |  1.9<H>    Ca    8.0<L>      26 Feb 2021 05:10  Mg     2.5     02-26    TPro  5.6<L>  /  Alb  2.8<L>  /  TBili  0.3  /  DBili  x   /  AST  59<H>  /  ALT  46<H>  /  AlkPhos  158<H>  02-26    Culture - Urine (02.23.21 @ 12:16)    -  Trimethoprim/Sulfamethoxazole: S <=0.5/9.5    -  Tigecycline: S <=2    -  Tobramycin: S <=2    -  Gentamicin: S <=2    -  Imipenem: S <=1    -  Levofloxacin: R >4    -  Meropenem: S <=1    -  Nitrofurantoin: S <=32 Should not be used to treat pyelonephritis    -  Piperacillin/Tazobactam: S <=8    -  Amikacin: S <=16    -  Amoxicillin/Clavulanic Acid: S <=8/4    -  Ampicillin: S <=8 These ampicillin results predict results for amoxicillin    -  Ampicillin/Sulbactam: S <=4/2 Enterobacter, Citrobacter, and Serratia may develop resistance during prolonged therapy (3-4 days)    -  Aztreonam: S <=4    -  Cefazolin: S 4 (MIC_CL_COM_ENTERIC_CEFAZU) For uncomplicated UTI with K. pneumoniae, E. coli, or P. mirablis: ARCELIA <=16 is sensitive and ARCELIA >=32 is resistant. This also predicts results for oral agents cefaclor, cefdinir, cefpodoxime, cefprozil, cefuroxime axetil, cephalexin and locarbef for uncomplicated UTI. Note that some isolates may be susceptible to these agents while testing resistant to cefazolin.    -  Cefepime: S <=2    -  Cefoxitin: S <=8    -  Ceftriaxone: S <=1 Enterobacter, Citrobacter, and Serratia may develop resistance during prolonged therapy    -  Ciprofloxacin: R >2    -  Ertapenem: S <=0.5    Specimen Source: .Urine Clean Catch (Midstream)    Culture Results:   >100,000 CFU/ml Escherichia coli    Organism Identification: Escherichia coli    Organism: Escherichia coli    Method Type: ARCELIA        Culture - Blood (collected 24 Feb 2021 11:14)  Source: .Blood None  Preliminary Report (25 Feb 2021 18:02):    No growth to date.    Culture - Blood (collected 24 Feb 2021 11:10)  Source: .Blood None  Preliminary Report (25 Feb 2021 18:02):    No growth to date.    < from: US Kidney and Bladder (02.23.21 @ 17:33) >  No hydronephrosis    Right kidney: 8.6 cm. No hydronephrosis or calculi.    Left kidney:  9.2 cm. No hydronephrosis or calculi.    Urinary bladder: No debris or calculus. Bilateral ureteral jets are visualized. Bladder volume is 40 cc, nondistended.    < end of copied text >      MEDICATIONS  (STANDING):  cefTRIAXone   IVPB 2000 milliGRAM(s) IV Intermittent every 24 hours  chlorhexidine 4% Liquid 1 Application(s) Topical <User Schedule>  finasteride 5 milliGRAM(s) Oral daily  heparin   Injectable 5000 Unit(s) SubCutaneous every 8 hours  lactated ringers. 1000 milliLiter(s) (75 mL/Hr) IV Continuous <Continuous>  tamsulosin 0.4 milliGRAM(s) Oral at bedtime    MEDICATIONS  (PRN):  acetaminophen  Suppository .. 650 milliGRAM(s) Rectal every 6 hours PRN Temp greater or equal to 38C (100.4F), Mild Pain (1 - 3)      PAST MEDICAL & SURGICAL HISTORY:  BPH (benign prostatic hyperplasia)    No significant past surgical history    a/p  # sepsis, UTI, improving , leukocytosis is decreasing. cont  # dried lips and poss ulceration, local care, mouth care, monitor closely, no open wound on mucosa.   # wt loss, ? gib, outpt followup   # BPH cont meds    will need rehab,   dnr , dni              pt seen and examined.   pt is awake and alert   HPI:  89y M PMD BPH presents for eval of fever. Pt developed fever this week with associated n/v, relieved with Tylenol, no aggravating factors. Patient reports weakness, fatigue and dysuria and poor PO intake for last few days. Also some chronic difficulty to urinate. A&Ox2 which per daughter is baseline.   Per daughter he lost 30-40 pounds in last few months and has been bleeding rectally.  He denies other symptoms, including chest pain, SOB, abdominal pain, back pain.        Vital Signs Last 24 Hrs  T(C): 36.1 (26 Feb 2021 05:10), Max: 36.3 (25 Feb 2021 14:13)  T(F): 97 (26 Feb 2021 05:10), Max: 97.4 (25 Feb 2021 14:13)  HR: 77 (26 Feb 2021 05:10) (72 - 80)  BP: 132/66 (26 Feb 2021 05:10) (127/61 - 152/68)  BP(mean): --  RR: 20 (26 Feb 2021 05:10) (18 - 20)  SpO2: --  Daily     Daily   I&O's Summary    25 Feb 2021 07:01  -  26 Feb 2021 07:00  --------------------------------------------------------  IN: 900 mL / OUT: 2 mL / NET: 898 mL        Physical exam:   constitutional No resp distress, lips have dried blood and ulceration. ( dw the physician who saw him yesterday, he had the same swelling yesterday) pt looks very tired. AAOX2, Respiratory  lungs CTA, CVS heart RRR, GI: abdomen Soft NT, ND, BS+,  neuro exam non focal. gen weakness                          11.1   21.49 )-----------( 164      ( 26 Feb 2021 05:10 )             33.9     02-26    148<H>  |  108  |  84<HH>  ----------------------------<  126<H>  3.7   |  27  |  1.9<H>    Ca    8.0<L>      26 Feb 2021 05:10  Mg     2.5     02-26    TPro  5.6<L>  /  Alb  2.8<L>  /  TBili  0.3  /  DBili  x   /  AST  59<H>  /  ALT  46<H>  /  AlkPhos  158<H>  02-26    Culture - Urine (02.23.21 @ 12:16)    -  Trimethoprim/Sulfamethoxazole: S <=0.5/9.5    -  Tigecycline: S <=2    -  Tobramycin: S <=2    -  Gentamicin: S <=2    -  Imipenem: S <=1    -  Levofloxacin: R >4    -  Meropenem: S <=1    -  Nitrofurantoin: S <=32 Should not be used to treat pyelonephritis    -  Piperacillin/Tazobactam: S <=8    -  Amikacin: S <=16    -  Amoxicillin/Clavulanic Acid: S <=8/4    -  Ampicillin: S <=8 These ampicillin results predict results for amoxicillin    -  Ampicillin/Sulbactam: S <=4/2 Enterobacter, Citrobacter, and Serratia may develop resistance during prolonged therapy (3-4 days)    -  Aztreonam: S <=4    -  Cefazolin: S 4 (MIC_CL_COM_ENTERIC_CEFAZU) For uncomplicated UTI with K. pneumoniae, E. coli, or P. mirablis: ARCELIA <=16 is sensitive and ARCELIA >=32 is resistant. This also predicts results for oral agents cefaclor, cefdinir, cefpodoxime, cefprozil, cefuroxime axetil, cephalexin and locarbef for uncomplicated UTI. Note that some isolates may be susceptible to these agents while testing resistant to cefazolin.    -  Cefepime: S <=2    -  Cefoxitin: S <=8    -  Ceftriaxone: S <=1 Enterobacter, Citrobacter, and Serratia may develop resistance during prolonged therapy    -  Ciprofloxacin: R >2    -  Ertapenem: S <=0.5    Specimen Source: .Urine Clean Catch (Midstream)    Culture Results:   >100,000 CFU/ml Escherichia coli    Organism Identification: Escherichia coli    Organism: Escherichia coli    Method Type: ARCELIA        Culture - Blood (collected 24 Feb 2021 11:14)  Source: .Blood None  Preliminary Report (25 Feb 2021 18:02):    No growth to date.    Culture - Blood (collected 24 Feb 2021 11:10)  Source: .Blood None  Preliminary Report (25 Feb 2021 18:02):    No growth to date.    < from: US Kidney and Bladder (02.23.21 @ 17:33) >  No hydronephrosis    Right kidney: 8.6 cm. No hydronephrosis or calculi.    Left kidney:  9.2 cm. No hydronephrosis or calculi.    Urinary bladder: No debris or calculus. Bilateral ureteral jets are visualized. Bladder volume is 40 cc, nondistended.    < end of copied text >      MEDICATIONS  (STANDING):  cefTRIAXone   IVPB 2000 milliGRAM(s) IV Intermittent every 24 hours  chlorhexidine 4% Liquid 1 Application(s) Topical <User Schedule>  finasteride 5 milliGRAM(s) Oral daily  heparin   Injectable 5000 Unit(s) SubCutaneous every 8 hours  lactated ringers. 1000 milliLiter(s) (75 mL/Hr) IV Continuous <Continuous>  tamsulosin 0.4 milliGRAM(s) Oral at bedtime    MEDICATIONS  (PRN):  acetaminophen  Suppository .. 650 milliGRAM(s) Rectal every 6 hours PRN Temp greater or equal to 38C (100.4F), Mild Pain (1 - 3)      PAST MEDICAL & SURGICAL HISTORY:  BPH (benign prostatic hyperplasia)    No significant past surgical history    a/p  # sepsis, UTI, improving , leukocytosis is decreasing. cont  # NIYA, hypernatremia, dehydration, improved cr, repeat labs in am after hydration   # dried lips and poss ulceration, local care, mouth care, monitor closely, no open wound on mucosa.   # wt loss, ? gib, outpt followup   # BPH cont meds  # had diarrhea, if has more loose bm, send cdiff  # poor po intake, check speech and swallow for poss modification of diet    will need rehab,   dnr , dni

## 2021-02-26 NOTE — PROGRESS NOTE ADULT - SUBJECTIVE AND OBJECTIVE BOX
HIEN, MIGUEL ÁNGEL  89y, Male    All available historical data reviewed    OVERNIGHT EVENTS:  no fevers  lethargic    ROS:  unable to obtain history secondary to patient's mental status and/or sedation     VITALS:  T(F): 97, Max: 97.4 (02-25-21 @ 14:13)  HR: 77  BP: 132/66  RR: 20Vital Signs Last 24 Hrs  T(C): 36.1 (26 Feb 2021 05:10), Max: 36.3 (25 Feb 2021 14:13)  T(F): 97 (26 Feb 2021 05:10), Max: 97.4 (25 Feb 2021 14:13)  HR: 77 (26 Feb 2021 05:10) (72 - 80)  BP: 132/66 (26 Feb 2021 05:10) (127/61 - 152/68)  BP(mean): --  RR: 20 (26 Feb 2021 05:10) (18 - 20)  SpO2: --    TESTS & MEASUREMENTS:                        11.1   21.49 )-----------( 164      ( 26 Feb 2021 05:10 )             33.9     02-26    148<H>  |  108  |  84<HH>  ----------------------------<  126<H>  3.7   |  27  |  1.9<H>    Ca    8.0<L>      26 Feb 2021 05:10  Mg     2.5     02-26    TPro  5.6<L>  /  Alb  2.8<L>  /  TBili  0.3  /  DBili  x   /  AST  59<H>  /  ALT  46<H>  /  AlkPhos  158<H>  02-26    LIVER FUNCTIONS - ( 26 Feb 2021 05:10 )  Alb: 2.8 g/dL / Pro: 5.6 g/dL / ALK PHOS: 158 U/L / ALT: 46 U/L / AST: 59 U/L / GGT: x             Culture - Blood (collected 02-24-21 @ 11:14)  Source: .Blood None  Preliminary Report (02-25-21 @ 18:02):    No growth to date.    Culture - Blood (collected 02-24-21 @ 11:10)  Source: .Blood None  Preliminary Report (02-25-21 @ 18:02):    No growth to date.    Culture - Urine (collected 02-23-21 @ 12:16)  Source: .Urine Clean Catch (Midstream)  Final Report (02-26-21 @ 02:48):    >100,000 CFU/ml Escherichia coli  Organism: Escherichia coli (02-26-21 @ 02:48)  Organism: Escherichia coli (02-26-21 @ 02:48)      -  Amikacin: S <=16      -  Amoxicillin/Clavulanic Acid: S <=8/4      -  Ampicillin: S <=8 These ampicillin results predict results for amoxicillin      -  Ampicillin/Sulbactam: S <=4/2 Enterobacter, Citrobacter, and Serratia may develop resistance during prolonged therapy (3-4 days)      -  Aztreonam: S <=4      -  Cefazolin: S 4 (MIC_CL_[id number]) For uncomplicated UTI with K. pneumoniae, E. coli, or P. mirablis: ARCELIA <=16 is sensitive and ARCELIA >=32 is resistant. This also predicts results for oral agents cefaclor, cefdinir, cefpodoxime, cefprozil, cefuroxime axetil, cephalexin and locarbef for uncomplicated UTI. Note that some isolates may be susceptible to these agents while testing resistant to cefazolin.      -  Cefepime: S <=2      -  Cefoxitin: S <=8      -  Ceftriaxone: S <=1 Enterobacter, Citrobacter, and Serratia may develop resistance during prolonged therapy      -  Ciprofloxacin: R >2      -  Ertapenem: S <=0.5      -  Gentamicin: S <=2      -  Imipenem: S <=1      -  Levofloxacin: R >4      -  Meropenem: S <=1      -  Nitrofurantoin: S <=32 Should not be used to treat pyelonephritis      -  Piperacillin/Tazobactam: S <=8      -  Tigecycline: S <=2      -  Tobramycin: S <=2      -  Trimethoprim/Sulfamethoxazole: S <=0.5/9.5      Method Type: ARCELIA    Culture - Blood (collected 02-23-21 @ 12:16)  Source: .Blood Blood-Peripheral  Gram Stain (02-24-21 @ 09:04):    Growth in aerobic bottle: Gram Negative Rods    Growth in anaerobic bottle: Gram Negative Rods  Preliminary Report (02-25-21 @ 15:04):    Growth in aerobic and anaerobic bottles: Escherichia coli    See previous culture [id number]    Culture - Blood (collected 02-23-21 @ 12:08)  Source: .Blood Blood-Peripheral  Gram Stain (02-24-21 @ 06:14):    Growth in aerobic and anaerobic bottles: Gram Negative Rods  Preliminary Report (02-25-21 @ 09:50):    Growth in aerobic and anaerobic bottles: Escherichia coli    ***Blood Panel PCR results on this specimen are available    approximately 3 hours after the Gram stain result.***    Gram stain, PCR, and/or culture results may not always    correspond due to difference in methodologies.    ************************************************************    This PCR assay was performed by multiplex PCR. This    Assay tests for 66 bacterial and resistance gene targets.    Please refer to the [facility] [employer] test directory    at [email or web address] for details.  Organism: Blood Culture PCR (02-24-21 @ 08:07)  Organism: Blood Culture PCR (02-24-21 @ 08:07)      -  Escherichia coli: Detec      Method Type: PCR            RADIOLOGY & ADDITIONAL TESTS:  Personal review of radiological diagnostics performed  Echo and EKG results noted when applicable.     MEDICATIONS:  acetaminophen  Suppository .. 650 milliGRAM(s) Rectal every 6 hours PRN  cefTRIAXone   IVPB 2000 milliGRAM(s) IV Intermittent every 24 hours  chlorhexidine 4% Liquid 1 Application(s) Topical <User Schedule>  finasteride 5 milliGRAM(s) Oral daily  heparin   Injectable 5000 Unit(s) SubCutaneous every 8 hours  lactated ringers. 1000 milliLiter(s) IV Continuous <Continuous>  tamsulosin 0.4 milliGRAM(s) Oral at bedtime      ANTIBIOTICS:  cefTRIAXone   IVPB 2000 milliGRAM(s) IV Intermittent every 24 hours

## 2021-02-27 LAB
ALBUMIN SERPL ELPH-MCNC: 2.7 G/DL — LOW (ref 3.5–5.2)
ALP SERPL-CCNC: 94 U/L — SIGNIFICANT CHANGE UP (ref 30–115)
ALT FLD-CCNC: 39 U/L — SIGNIFICANT CHANGE UP (ref 0–41)
ANION GAP SERPL CALC-SCNC: 9 MMOL/L — SIGNIFICANT CHANGE UP (ref 7–14)
AST SERPL-CCNC: 41 U/L — SIGNIFICANT CHANGE UP (ref 0–41)
BASOPHILS # BLD AUTO: 0.04 K/UL — SIGNIFICANT CHANGE UP (ref 0–0.2)
BASOPHILS NFR BLD AUTO: 0.3 % — SIGNIFICANT CHANGE UP (ref 0–1)
BILIRUB SERPL-MCNC: 0.3 MG/DL — SIGNIFICANT CHANGE UP (ref 0.2–1.2)
BUN SERPL-MCNC: 53 MG/DL — HIGH (ref 10–20)
CALCIUM SERPL-MCNC: 8.1 MG/DL — LOW (ref 8.5–10.1)
CHLORIDE SERPL-SCNC: 108 MMOL/L — SIGNIFICANT CHANGE UP (ref 98–110)
CO2 SERPL-SCNC: 28 MMOL/L — SIGNIFICANT CHANGE UP (ref 17–32)
CREAT SERPL-MCNC: 1.4 MG/DL — SIGNIFICANT CHANGE UP (ref 0.7–1.5)
EOSINOPHIL # BLD AUTO: 0.36 K/UL — SIGNIFICANT CHANGE UP (ref 0–0.7)
EOSINOPHIL NFR BLD AUTO: 3 % — SIGNIFICANT CHANGE UP (ref 0–8)
GLUCOSE SERPL-MCNC: 124 MG/DL — HIGH (ref 70–99)
HCT VFR BLD CALC: 34.9 % — LOW (ref 42–52)
HGB BLD-MCNC: 11.3 G/DL — LOW (ref 14–18)
IMM GRANULOCYTES NFR BLD AUTO: 2.6 % — HIGH (ref 0.1–0.3)
LYMPHOCYTES # BLD AUTO: 1.22 K/UL — SIGNIFICANT CHANGE UP (ref 1.2–3.4)
LYMPHOCYTES # BLD AUTO: 10.2 % — LOW (ref 20.5–51.1)
MAGNESIUM SERPL-MCNC: 2.1 MG/DL — SIGNIFICANT CHANGE UP (ref 1.8–2.4)
MCHC RBC-ENTMCNC: 31.2 PG — HIGH (ref 27–31)
MCHC RBC-ENTMCNC: 32.4 G/DL — SIGNIFICANT CHANGE UP (ref 32–37)
MCV RBC AUTO: 96.4 FL — HIGH (ref 80–94)
MONOCYTES # BLD AUTO: 0.65 K/UL — HIGH (ref 0.1–0.6)
MONOCYTES NFR BLD AUTO: 5.4 % — SIGNIFICANT CHANGE UP (ref 1.7–9.3)
NEUTROPHILS # BLD AUTO: 9.42 K/UL — HIGH (ref 1.4–6.5)
NEUTROPHILS NFR BLD AUTO: 78.5 % — HIGH (ref 42.2–75.2)
NRBC # BLD: 0 /100 WBCS — SIGNIFICANT CHANGE UP (ref 0–0)
PHOSPHATE SERPL-MCNC: 2.6 MG/DL — SIGNIFICANT CHANGE UP (ref 2.1–4.9)
PLATELET # BLD AUTO: 157 K/UL — SIGNIFICANT CHANGE UP (ref 130–400)
POTASSIUM SERPL-MCNC: 3.7 MMOL/L — SIGNIFICANT CHANGE UP (ref 3.5–5)
POTASSIUM SERPL-SCNC: 3.7 MMOL/L — SIGNIFICANT CHANGE UP (ref 3.5–5)
PROT SERPL-MCNC: 5.4 G/DL — LOW (ref 6–8)
RBC # BLD: 3.62 M/UL — LOW (ref 4.7–6.1)
RBC # FLD: 12.1 % — SIGNIFICANT CHANGE UP (ref 11.5–14.5)
SODIUM SERPL-SCNC: 145 MMOL/L — SIGNIFICANT CHANGE UP (ref 135–146)
WBC # BLD: 12 K/UL — HIGH (ref 4.8–10.8)
WBC # FLD AUTO: 12 K/UL — HIGH (ref 4.8–10.8)

## 2021-02-27 PROCEDURE — 99233 SBSQ HOSP IP/OBS HIGH 50: CPT

## 2021-02-27 PROCEDURE — 88189 FLOWCYTOMETRY/READ 16 & >: CPT

## 2021-02-27 RX ORDER — AMLODIPINE BESYLATE 2.5 MG/1
5 TABLET ORAL ONCE
Refills: 0 | Status: COMPLETED | OUTPATIENT
Start: 2021-02-27 | End: 2021-02-27

## 2021-02-27 RX ORDER — AMLODIPINE BESYLATE 2.5 MG/1
5 TABLET ORAL
Refills: 0 | Status: DISCONTINUED | OUTPATIENT
Start: 2021-02-28 | End: 2021-03-04

## 2021-02-27 RX ADMIN — HEPARIN SODIUM 5000 UNIT(S): 5000 INJECTION INTRAVENOUS; SUBCUTANEOUS at 05:23

## 2021-02-27 RX ADMIN — CEFTRIAXONE 100 MILLIGRAM(S): 500 INJECTION, POWDER, FOR SOLUTION INTRAMUSCULAR; INTRAVENOUS at 17:19

## 2021-02-27 RX ADMIN — Medication 1 APPLICATION(S): at 12:00

## 2021-02-27 RX ADMIN — HEPARIN SODIUM 5000 UNIT(S): 5000 INJECTION INTRAVENOUS; SUBCUTANEOUS at 12:01

## 2021-02-27 RX ADMIN — HEPARIN SODIUM 5000 UNIT(S): 5000 INJECTION INTRAVENOUS; SUBCUTANEOUS at 21:47

## 2021-02-27 RX ADMIN — Medication 1 APPLICATION(S): at 08:01

## 2021-02-27 RX ADMIN — Medication 1 APPLICATION(S): at 21:47

## 2021-02-27 RX ADMIN — FINASTERIDE 5 MILLIGRAM(S): 5 TABLET, FILM COATED ORAL at 11:58

## 2021-02-27 RX ADMIN — Medication 1 MILLIGRAM(S): at 11:58

## 2021-02-27 RX ADMIN — AMLODIPINE BESYLATE 5 MILLIGRAM(S): 2.5 TABLET ORAL at 22:55

## 2021-02-27 RX ADMIN — TAMSULOSIN HYDROCHLORIDE 0.4 MILLIGRAM(S): 0.4 CAPSULE ORAL at 21:46

## 2021-02-27 RX ADMIN — AMLODIPINE BESYLATE 5 MILLIGRAM(S): 2.5 TABLET ORAL at 17:50

## 2021-02-27 RX ADMIN — CHLORHEXIDINE GLUCONATE 1 APPLICATION(S): 213 SOLUTION TOPICAL at 05:22

## 2021-02-27 NOTE — PROGRESS NOTE ADULT - ASSESSMENT
89 yo M with hx BPH, dementia, CKD, admitted on 2/23/21 with NIYA and pyelonephritis with E coli bacteremia ( + blood cultures ESBL + E coli from 2/23/21).     # sepsis, bacteremia UTI, improving   # Leukocytosis likely 2/2 sepsis, r/o AML/CML  - + blood cultures ESBL + E coli from 2/23/21  - leukocytosis is decreasing  PLAN  - His WBC increased dramatically from 16,000  to 56,000 between 2/23 and 2/24 he is being worked up to r/o AML/CML  PLAN  - He was seen by ID and Hem/Onc,and is being treated with iv rocephin  - cont IV rocephin (plan for 7 days of total IV rocephin from 2/25 and then PO augmentin for 7 more days)  - monitor WBC  - f/u Flow cytometry  - urine cytology was ordered, as per ID recommendation, to r/o bladder malignancy  - cont IVF    # NIYA, hypernatremia, dehydration  - improved cr and sodium  PLAN  - will cont D5 1/2NS at 75 cc/hr  - will encourage PO intake  - renal on board    # dried lips and poss ulceration; wt loss; poor PO intake FTT   - local care, mouth care, monitor closely, no open wound on mucosa.  - Oral care was ordered after meals and at bedtime  - speech and swallow recs appreciated  - puree diet with thin liquids due to weakness and no bottom teeth  - cont Ensure and pudding  - 1:1 supervised meals as per SLP    # BPH  - cont meds    # diarrhea- improved  - if has more loose bm, send cdiff    # GOC  - The patient was made DNR/DNI; MOLST form is in the chart    Progress Note Handoff  Pending Consults: None  Pending Tests: Urine cytology, flow cytometry   Pending Results: Improvement in clinical condition  Family Discussion: Patient and medical staff  Disposition: Home_____/SNF______/Other_____/Unknown at this time__X will likely need rehab___

## 2021-02-27 NOTE — PROGRESS NOTE ADULT - SUBJECTIVE AND OBJECTIVE BOX
MIGUEL ÁNGEL STANFORD  89y  Male      Patient is a 89y old  Male who presents with a chief complaint of UTI (26 Feb 2021 16:28)      INTERVAL HPI/OVERNIGHT EVENTS:  Patient seen and examined earlier this morning. Pt denies any new complaints; reports he feels well; denies F/C, CP, palpitations, SOB, N/V, abd pain, diarrhea, constipation. He reports that he has been trying to eat more.       REVIEW OF SYSTEMS:  CONSTITUTIONAL: No fever, weight loss, or fatigue  ENMT:  No difficulty hearing, tinnitus, vertigo; No sinus or throat pain  NECK: No pain or stiffness  RESPIRATORY: No cough, wheezing, chills or hemoptysis; No shortness of breath  CARDIOVASCULAR: No chest pain, palpitations, dizziness, or leg swelling  GASTROINTESTINAL: No abdominal or epigastric pain. No nausea, vomiting, or hematemesis; No diarrhea or constipation.  GENITOURINARY: No dysuria, frequency, hematuria, or incontinence  NEUROLOGICAL: No headaches, memory loss, loss of strength, numbness, or tremors  MUSCULOSKELETAL: No joint pain or swelling; No muscle, back, or extremity pain      T(C): 36.8 (02-27-21 @ 05:37), Max: 37.3 (02-26-21 @ 21:18)  HR: 98 (02-27-21 @ 05:37) (69 - 98)  BP: 158/71 (02-27-21 @ 05:37) (150/78 - 188/81)  RR: 20 (02-27-21 @ 05:37) (20 - 20)  SpO2: 99% (02-27-21 @ 05:37) (99% - 99%)    PHYSICAL EXAM:  GENERAL: NAD, thin male, appears slightly tired  HEAD:  Atraumatic, Normocephalic  EYES: EOMI, PERRLA, conjunctiva and sclera clear  ENMT: AT, NC, lips have dried blood and ulceration.  NECK: Supple, No JVD, Normal thyroid  NERVOUS SYSTEM:  Alert & Oriented X2-3, Motor Strength 5/5 B/L upper and lower extremities  CHEST/LUNG: Clear to percussion bilaterally; No rales, rhonchi, wheezing, or rubs  HEART: Regular rate and rhythm; No murmurs, rubs, or gallops  ABDOMEN: Soft, Nontender, Nondistended; Bowel sounds present  EXTREMITIES:  2+ Peripheral Pulses, No clubbing, cyanosis, or edema      Consultant(s) Notes Reviewed:  [x ] YES  [ ] NO  Care Discussed with Consultants/Other Providers [ x] YES  [ ] NO    LAB:                        11.3   12.00 )-----------( 157      ( 27 Feb 2021 06:08 )             34.9     02-27    145  |  108  |  53<H>  ----------------------------<  124<H>  3.7   |  28  |  1.4    Ca    8.1<L>      27 Feb 2021 06:08  Phos  2.6     02-27  Mg     2.1     02-27    TPro  5.4<L>  /  Alb  2.7<L>  /  TBili  0.3  /  DBili  x   /  AST  41  /  ALT  39  /  AlkPhos  94  02-27    LIVER FUNCTIONS - ( 27 Feb 2021 06:08 )  Alb: 2.7 g/dL / Pro: 5.4 g/dL / ALK PHOS: 94 U/L / ALT: 39 U/L / AST: 41 U/L / GGT: x                       Drug Dosing Weight  Height (cm): 170.2 (25 Feb 2021 11:29)  Weight (kg): 55.2 (24 Feb 2021 00:51)  BMI (kg/m2): 19.1 (25 Feb 2021 11:29)  BSA (m2): 1.64 (25 Feb 2021 11:29)    CAPILLARY BLOOD GLUCOSE        I&O's Summary    26 Feb 2021 07:01  -  27 Feb 2021 07:00  --------------------------------------------------------  IN: 1415 mL / OUT: 0 mL / NET: 1415 mL    27 Feb 2021 07:01  -  27 Feb 2021 11:58  --------------------------------------------------------  IN: 300 mL / OUT: 0 mL / NET: 300 mL          RADIOLOGY & ADDITIONAL TESTS:  Imaging Personally Reviewed:  [x] YES  [ ] NO    HEALTH ISSUES - PROBLEM Dx:          MEDS:  acetaminophen  Suppository .. 650 milliGRAM(s) Rectal every 6 hours PRN  cefTRIAXone   IVPB 2000 milliGRAM(s) IV Intermittent every 24 hours  chlorhexidine 4% Liquid 1 Application(s) Topical <User Schedule>  dextrose 5% + sodium chloride 0.45%. 1000 milliLiter(s) IV Continuous <Continuous>  finasteride 5 milliGRAM(s) Oral daily  folic acid 1 milliGRAM(s) Oral daily  heparin   Injectable 5000 Unit(s) SubCutaneous every 8 hours  tamsulosin 0.4 milliGRAM(s) Oral at bedtime  vitamin A &amp; D Ointment 1 Application(s) Topical <User Schedule>

## 2021-02-27 NOTE — CHART NOTE - NSCHARTNOTEFT_GEN_A_CORE
Notified family: Daughter  Update given: Called to verify patient was not on any anti-hypertensive medications at home.  Discussed current course of care and discharge plan to rehab.  All questions and concerns addressed to family's satisfaction.  Family encouraged to contact me with any further concerns.  Spoke with attending about high BP, STAT dose of amlodipine and added amlodipine 5 mg QD for tomorrow am.

## 2021-02-28 LAB
ALBUMIN SERPL ELPH-MCNC: 2.4 G/DL — LOW (ref 3.5–5.2)
ALP SERPL-CCNC: 92 U/L — SIGNIFICANT CHANGE UP (ref 30–115)
ALT FLD-CCNC: 45 U/L — HIGH (ref 0–41)
ANION GAP SERPL CALC-SCNC: 7 MMOL/L — SIGNIFICANT CHANGE UP (ref 7–14)
AST SERPL-CCNC: 49 U/L — HIGH (ref 0–41)
BASOPHILS # BLD AUTO: 0.03 K/UL — SIGNIFICANT CHANGE UP (ref 0–0.2)
BASOPHILS # BLD AUTO: 0.04 K/UL — SIGNIFICANT CHANGE UP (ref 0–0.2)
BASOPHILS NFR BLD AUTO: 0.2 % — SIGNIFICANT CHANGE UP (ref 0–1)
BASOPHILS NFR BLD AUTO: 0.3 % — SIGNIFICANT CHANGE UP (ref 0–1)
BILIRUB SERPL-MCNC: 0.3 MG/DL — SIGNIFICANT CHANGE UP (ref 0.2–1.2)
BUN SERPL-MCNC: 31 MG/DL — HIGH (ref 10–20)
CALCIUM SERPL-MCNC: 7.7 MG/DL — LOW (ref 8.5–10.1)
CHLORIDE SERPL-SCNC: 106 MMOL/L — SIGNIFICANT CHANGE UP (ref 98–110)
CO2 SERPL-SCNC: 28 MMOL/L — SIGNIFICANT CHANGE UP (ref 17–32)
CREAT SERPL-MCNC: 1.1 MG/DL — SIGNIFICANT CHANGE UP (ref 0.7–1.5)
EOSINOPHIL # BLD AUTO: 0.36 K/UL — SIGNIFICANT CHANGE UP (ref 0–0.7)
EOSINOPHIL # BLD AUTO: 0.41 K/UL — SIGNIFICANT CHANGE UP (ref 0–0.7)
EOSINOPHIL NFR BLD AUTO: 2.5 % — SIGNIFICANT CHANGE UP (ref 0–8)
EOSINOPHIL NFR BLD AUTO: 3.2 % — SIGNIFICANT CHANGE UP (ref 0–8)
GLUCOSE SERPL-MCNC: 134 MG/DL — HIGH (ref 70–99)
HCT VFR BLD CALC: 33.9 % — LOW (ref 42–52)
HCT VFR BLD CALC: 34.3 % — LOW (ref 42–52)
HGB BLD-MCNC: 10.8 G/DL — LOW (ref 14–18)
HGB BLD-MCNC: 10.9 G/DL — LOW (ref 14–18)
IMM GRANULOCYTES NFR BLD AUTO: 2.2 % — HIGH (ref 0.1–0.3)
IMM GRANULOCYTES NFR BLD AUTO: 2.4 % — HIGH (ref 0.1–0.3)
LYMPHOCYTES # BLD AUTO: 1.3 K/UL — SIGNIFICANT CHANGE UP (ref 1.2–3.4)
LYMPHOCYTES # BLD AUTO: 1.44 K/UL — SIGNIFICANT CHANGE UP (ref 1.2–3.4)
LYMPHOCYTES # BLD AUTO: 10.1 % — LOW (ref 20.5–51.1)
LYMPHOCYTES # BLD AUTO: 10.2 % — LOW (ref 20.5–51.1)
MAGNESIUM SERPL-MCNC: 1.7 MG/DL — LOW (ref 1.8–2.4)
MCHC RBC-ENTMCNC: 30.8 PG — SIGNIFICANT CHANGE UP (ref 27–31)
MCHC RBC-ENTMCNC: 31.1 PG — HIGH (ref 27–31)
MCHC RBC-ENTMCNC: 31.8 G/DL — LOW (ref 32–37)
MCHC RBC-ENTMCNC: 31.9 G/DL — LOW (ref 32–37)
MCV RBC AUTO: 96.9 FL — HIGH (ref 80–94)
MCV RBC AUTO: 97.7 FL — HIGH (ref 80–94)
MONOCYTES # BLD AUTO: 0.69 K/UL — HIGH (ref 0.1–0.6)
MONOCYTES # BLD AUTO: 0.7 K/UL — HIGH (ref 0.1–0.6)
MONOCYTES NFR BLD AUTO: 4.8 % — SIGNIFICANT CHANGE UP (ref 1.7–9.3)
MONOCYTES NFR BLD AUTO: 5.5 % — SIGNIFICANT CHANGE UP (ref 1.7–9.3)
NEUTROPHILS # BLD AUTO: 10 K/UL — HIGH (ref 1.4–6.5)
NEUTROPHILS # BLD AUTO: 11.4 K/UL — HIGH (ref 1.4–6.5)
NEUTROPHILS NFR BLD AUTO: 78.4 % — HIGH (ref 42.2–75.2)
NEUTROPHILS NFR BLD AUTO: 80.2 % — HIGH (ref 42.2–75.2)
NRBC # BLD: 0 /100 WBCS — SIGNIFICANT CHANGE UP (ref 0–0)
NRBC # BLD: 0 /100 WBCS — SIGNIFICANT CHANGE UP (ref 0–0)
PHOSPHATE SERPL-MCNC: 3.1 MG/DL — SIGNIFICANT CHANGE UP (ref 2.1–4.9)
PLATELET # BLD AUTO: 177 K/UL — SIGNIFICANT CHANGE UP (ref 130–400)
PLATELET # BLD AUTO: 191 K/UL — SIGNIFICANT CHANGE UP (ref 130–400)
POTASSIUM SERPL-MCNC: 3.4 MMOL/L — LOW (ref 3.5–5)
POTASSIUM SERPL-SCNC: 3.4 MMOL/L — LOW (ref 3.5–5)
PROT SERPL-MCNC: 5.2 G/DL — LOW (ref 6–8)
RBC # BLD: 3.47 M/UL — LOW (ref 4.7–6.1)
RBC # BLD: 3.54 M/UL — LOW (ref 4.7–6.1)
RBC # FLD: 11.9 % — SIGNIFICANT CHANGE UP (ref 11.5–14.5)
RBC # FLD: 11.9 % — SIGNIFICANT CHANGE UP (ref 11.5–14.5)
SODIUM SERPL-SCNC: 141 MMOL/L — SIGNIFICANT CHANGE UP (ref 135–146)
WBC # BLD: 12.76 K/UL — HIGH (ref 4.8–10.8)
WBC # BLD: 14.24 K/UL — HIGH (ref 4.8–10.8)
WBC # FLD AUTO: 12.76 K/UL — HIGH (ref 4.8–10.8)
WBC # FLD AUTO: 14.24 K/UL — HIGH (ref 4.8–10.8)

## 2021-02-28 PROCEDURE — 99233 SBSQ HOSP IP/OBS HIGH 50: CPT

## 2021-02-28 RX ORDER — POTASSIUM CHLORIDE 20 MEQ
40 PACKET (EA) ORAL ONCE
Refills: 0 | Status: COMPLETED | OUTPATIENT
Start: 2021-02-28 | End: 2021-02-28

## 2021-02-28 RX ORDER — MAGNESIUM SULFATE 500 MG/ML
1 VIAL (ML) INJECTION ONCE
Refills: 0 | Status: COMPLETED | OUTPATIENT
Start: 2021-02-28 | End: 2021-02-28

## 2021-02-28 RX ORDER — LABETALOL HCL 100 MG
10 TABLET ORAL ONCE
Refills: 0 | Status: COMPLETED | OUTPATIENT
Start: 2021-02-28 | End: 2021-02-28

## 2021-02-28 RX ADMIN — SODIUM CHLORIDE 75 MILLILITER(S): 9 INJECTION, SOLUTION INTRAVENOUS at 06:49

## 2021-02-28 RX ADMIN — Medication 1 APPLICATION(S): at 11:27

## 2021-02-28 RX ADMIN — Medication 1 APPLICATION(S): at 15:58

## 2021-02-28 RX ADMIN — AMLODIPINE BESYLATE 5 MILLIGRAM(S): 2.5 TABLET ORAL at 11:27

## 2021-02-28 RX ADMIN — Medication 1 APPLICATION(S): at 21:47

## 2021-02-28 RX ADMIN — HEPARIN SODIUM 5000 UNIT(S): 5000 INJECTION INTRAVENOUS; SUBCUTANEOUS at 06:22

## 2021-02-28 RX ADMIN — FINASTERIDE 5 MILLIGRAM(S): 5 TABLET, FILM COATED ORAL at 11:28

## 2021-02-28 RX ADMIN — Medication 40 MILLIEQUIVALENT(S): at 09:07

## 2021-02-28 RX ADMIN — CEFTRIAXONE 100 MILLIGRAM(S): 500 INJECTION, POWDER, FOR SOLUTION INTRAMUSCULAR; INTRAVENOUS at 17:05

## 2021-02-28 RX ADMIN — Medication 100 GRAM(S): at 08:56

## 2021-02-28 RX ADMIN — HEPARIN SODIUM 5000 UNIT(S): 5000 INJECTION INTRAVENOUS; SUBCUTANEOUS at 21:46

## 2021-02-28 RX ADMIN — CHLORHEXIDINE GLUCONATE 1 APPLICATION(S): 213 SOLUTION TOPICAL at 06:21

## 2021-02-28 RX ADMIN — TAMSULOSIN HYDROCHLORIDE 0.4 MILLIGRAM(S): 0.4 CAPSULE ORAL at 21:46

## 2021-02-28 RX ADMIN — HEPARIN SODIUM 5000 UNIT(S): 5000 INJECTION INTRAVENOUS; SUBCUTANEOUS at 12:50

## 2021-02-28 RX ADMIN — Medication 1 MILLIGRAM(S): at 11:28

## 2021-02-28 RX ADMIN — Medication 10 MILLIGRAM(S): at 01:40

## 2021-02-28 NOTE — PROGRESS NOTE ADULT - ASSESSMENT
89 yo M with hx BPH, dementia, CKD, admitted on 2/23/21 with NIYA and pyelonephritis with E coli bacteremia ( + blood cultures ESBL + E coli from 2/23/21).     # sepsis, bacteremia UTI, improving   # Leukocytosis likely 2/2 sepsis, r/o AML/CML  - + blood cultures ESBL + E coli from 2/23/21  - His WBC increased dramatically from 16,000  to 56,000 between 2/23 and 2/24 he is being worked up to r/o AML/CML  - leukocytosis is decreasing  PLAN  - He was seen by ID and Hem/Onc  - cont IV rocephin (plan for 7 days of total IV rocephin from 2/25 and then PO augmentin for 7 more days)  - monitor WBC  - f/u Flow cytometry    # NIYA, hypernatremia, dehydration  - improved cr and sodium  PLAN  - will d/c IVF  - will encourage PO intake  - renal on board    # dried lips and poss ulceration; wt loss; poor PO intake FTT; r/o underlying malignancy   - local care, mouth care, monitor closely, no open wound on mucosa.  - speech and swallow recs appreciated; puree diet with thin liquids due to weakness and no bottom teeth  - cont Ensure and pudding; 1:1 supervised meals as per SLP  - heme/onc followup as outpatient; F/u with Dr. Dia 3/16 at 1:15  - f/u Flow cytometry  - Patient will need urology follow up as outpatient to r/o bladder malignancy (microscopic hematuria noted)    # BPH  - cont meds    # diarrhea- improved  - if has more loose bm, send cdiff    # GOC  - The patient was made DNR/DNI; MOLST form is in the chart    Progress Note Handoff  Pending Consults: None  Pending Tests: flow cytometry   Pending Results: Improvement in clinical condition  Family Discussion: Patient and medical staff  Disposition: Home_____/SNF______/Other_____/Unknown at this time__X will likely need rehab; on IV abx until 3/3___

## 2021-02-28 NOTE — PROGRESS NOTE ADULT - SUBJECTIVE AND OBJECTIVE BOX
MIGUEL ÁNGEL STANFORD  89y  Male      Patient is a 89y old  Male who presents with a chief complaint of UTI (26 Feb 2021 16:28)      INTERVAL HPI/OVERNIGHT EVENTS:  Patient seen and examined earlier this morning. Pt denies any new complaints; reports he feels well; reports he is eating well. Denies F/C, CP, palpitations, SOB, N/V, abd pain, diarrhea, constipation.      REVIEW OF SYSTEMS:  CONSTITUTIONAL: No fever, weight loss, or fatigue  ENMT:  No difficulty hearing, tinnitus, vertigo; No sinus or throat pain  NECK: No pain or stiffness  RESPIRATORY: No cough, wheezing, chills or hemoptysis; No shortness of breath  CARDIOVASCULAR: No chest pain, palpitations, dizziness, or leg swelling  GASTROINTESTINAL: No abdominal or epigastric pain. No nausea, vomiting, or hematemesis; No diarrhea or constipation.  GENITOURINARY: No dysuria, frequency, hematuria, or incontinence  NEUROLOGICAL: No headaches, memory loss, loss of strength, numbness, or tremors  MUSCULOSKELETAL: No joint pain or swelling; No muscle, back, or extremity pain      Vital Signs Last 24 Hrs  T(C): 36.8 (28 Feb 2021 05:29), Max: 36.8 (28 Feb 2021 05:29)  T(F): 98.2 (28 Feb 2021 05:29), Max: 98.2 (28 Feb 2021 05:29)  HR: 67 (28 Feb 2021 05:29) (61 - 70)  BP: 129/61 (28 Feb 2021 05:29) (129/61 - 192/76)  BP(mean): --  RR: 18 (28 Feb 2021 05:29) (18 - 18)  SpO2: 98% (27 Feb 2021 16:13) (98% - 98%)    PHYSICAL EXAM:  GENERAL: NAD, thin male, appears slightly tired  HEAD:  Atraumatic, Normocephalic  EYES: EOMI, PERRLA, conjunctiva and sclera clear  ENMT: AT, NC, lips have dried blood and ulceration.  NECK: Supple, No JVD, Normal thyroid  NERVOUS SYSTEM:  Alert & Oriented X2-3, Motor Strength 5/5 B/L upper and lower extremities  CHEST/LUNG: Clear to percussion bilaterally; No rales, rhonchi, wheezing, or rubs  HEART: Regular rate and rhythm; No murmurs, rubs, or gallops  ABDOMEN: Soft, Nontender, Nondistended; Bowel sounds present  EXTREMITIES:  2+ Peripheral Pulses, No clubbing, cyanosis, or edema      Consultant(s) Notes Reviewed:  [x ] YES  [ ] NO  Care Discussed with Consultants/Other Providers [ x] YES  [ ] NO    LAB:                        10.8   12.76 )-----------( 177      ( 28 Feb 2021 05:38 )             33.9   02-28    141  |  106  |  31<H>  ----------------------------<  134<H>  3.4<L>   |  28  |  1.1    Ca    7.7<L>      28 Feb 2021 05:38  Phos  3.1     02-28  Mg     1.7     02-28    TPro  5.2<L>  /  Alb  2.4<L>  /  TBili  0.3  /  DBili  x   /  AST  49<H>  /  ALT  45<H>  /  AlkPhos  92  02-28      Drug Dosing Weight  Height (cm): 170.2 (25 Feb 2021 11:29)  Weight (kg): 55.2 (24 Feb 2021 00:51)  BMI (kg/m2): 19.1 (25 Feb 2021 11:29)  BSA (m2): 1.64 (25 Feb 2021 11:29)    CAPILLARY BLOOD GLUCOSE      I&O's Summary    27 Feb 2021 07:01  -  28 Feb 2021 07:00  --------------------------------------------------------  IN: 1370 mL / OUT: 0 mL / NET: 1370 mL    28 Feb 2021 07:01  -  28 Feb 2021 12:09  --------------------------------------------------------  IN: 250 mL / OUT: 0 mL / NET: 250 mL              RADIOLOGY & ADDITIONAL TESTS:  Imaging Personally Reviewed:  [x] YES  [ ] NO    HEALTH ISSUES - PROBLEM Dx:      MEDICATIONS  (STANDING):  amLODIPine   Tablet 5 milliGRAM(s) Oral <User Schedule>  cefTRIAXone   IVPB 2000 milliGRAM(s) IV Intermittent every 24 hours  chlorhexidine 4% Liquid 1 Application(s) Topical <User Schedule>  finasteride 5 milliGRAM(s) Oral daily  folic acid 1 milliGRAM(s) Oral daily  heparin   Injectable 5000 Unit(s) SubCutaneous every 8 hours  tamsulosin 0.4 milliGRAM(s) Oral at bedtime  vitamin A &amp; D Ointment 1 Application(s) Topical <User Schedule>    MEDICATIONS  (PRN):  acetaminophen  Suppository .. 650 milliGRAM(s) Rectal every 6 hours PRN Temp greater or equal to 38C (100.4F), Mild Pain (1 - 3)

## 2021-03-01 LAB
ALBUMIN SERPL ELPH-MCNC: 2.7 G/DL — LOW (ref 3.5–5.2)
ALP SERPL-CCNC: 93 U/L — SIGNIFICANT CHANGE UP (ref 30–115)
ALT FLD-CCNC: 44 U/L — HIGH (ref 0–41)
ANION GAP SERPL CALC-SCNC: 11 MMOL/L — SIGNIFICANT CHANGE UP (ref 7–14)
AST SERPL-CCNC: 45 U/L — HIGH (ref 0–41)
BASOPHILS # BLD AUTO: 0.02 K/UL — SIGNIFICANT CHANGE UP (ref 0–0.2)
BASOPHILS NFR BLD AUTO: 0.2 % — SIGNIFICANT CHANGE UP (ref 0–1)
BILIRUB SERPL-MCNC: 0.3 MG/DL — SIGNIFICANT CHANGE UP (ref 0.2–1.2)
BUN SERPL-MCNC: 25 MG/DL — HIGH (ref 10–20)
CALCIUM SERPL-MCNC: 7.9 MG/DL — LOW (ref 8.5–10.1)
CHLORIDE SERPL-SCNC: 106 MMOL/L — SIGNIFICANT CHANGE UP (ref 98–110)
CO2 SERPL-SCNC: 26 MMOL/L — SIGNIFICANT CHANGE UP (ref 17–32)
CREAT SERPL-MCNC: 1.1 MG/DL — SIGNIFICANT CHANGE UP (ref 0.7–1.5)
CULTURE RESULTS: SIGNIFICANT CHANGE UP
CULTURE RESULTS: SIGNIFICANT CHANGE UP
EOSINOPHIL # BLD AUTO: 0.36 K/UL — SIGNIFICANT CHANGE UP (ref 0–0.7)
EOSINOPHIL NFR BLD AUTO: 3 % — SIGNIFICANT CHANGE UP (ref 0–8)
GLUCOSE SERPL-MCNC: 133 MG/DL — HIGH (ref 70–99)
HCT VFR BLD CALC: 33.8 % — LOW (ref 42–52)
HGB BLD-MCNC: 11 G/DL — LOW (ref 14–18)
IMM GRANULOCYTES NFR BLD AUTO: 2.2 % — HIGH (ref 0.1–0.3)
LYMPHOCYTES # BLD AUTO: 1.5 K/UL — SIGNIFICANT CHANGE UP (ref 1.2–3.4)
LYMPHOCYTES # BLD AUTO: 12.4 % — LOW (ref 20.5–51.1)
MAGNESIUM SERPL-MCNC: 1.9 MG/DL — SIGNIFICANT CHANGE UP (ref 1.8–2.4)
MCHC RBC-ENTMCNC: 31.3 PG — HIGH (ref 27–31)
MCHC RBC-ENTMCNC: 32.5 G/DL — SIGNIFICANT CHANGE UP (ref 32–37)
MCV RBC AUTO: 96 FL — HIGH (ref 80–94)
MONOCYTES # BLD AUTO: 0.78 K/UL — HIGH (ref 0.1–0.6)
MONOCYTES NFR BLD AUTO: 6.5 % — SIGNIFICANT CHANGE UP (ref 1.7–9.3)
NEUTROPHILS # BLD AUTO: 9.14 K/UL — HIGH (ref 1.4–6.5)
NEUTROPHILS NFR BLD AUTO: 75.7 % — HIGH (ref 42.2–75.2)
NRBC # BLD: 0 /100 WBCS — SIGNIFICANT CHANGE UP (ref 0–0)
PHOSPHATE SERPL-MCNC: 2.9 MG/DL — SIGNIFICANT CHANGE UP (ref 2.1–4.9)
PLATELET # BLD AUTO: 210 K/UL — SIGNIFICANT CHANGE UP (ref 130–400)
POTASSIUM SERPL-MCNC: 3.9 MMOL/L — SIGNIFICANT CHANGE UP (ref 3.5–5)
POTASSIUM SERPL-SCNC: 3.9 MMOL/L — SIGNIFICANT CHANGE UP (ref 3.5–5)
PROT SERPL-MCNC: 5.4 G/DL — LOW (ref 6–8)
RBC # BLD: 3.52 M/UL — LOW (ref 4.7–6.1)
RBC # FLD: 11.9 % — SIGNIFICANT CHANGE UP (ref 11.5–14.5)
SODIUM SERPL-SCNC: 143 MMOL/L — SIGNIFICANT CHANGE UP (ref 135–146)
SPECIMEN SOURCE: SIGNIFICANT CHANGE UP
SPECIMEN SOURCE: SIGNIFICANT CHANGE UP
WBC # BLD: 12.07 K/UL — HIGH (ref 4.8–10.8)
WBC # FLD AUTO: 12.07 K/UL — HIGH (ref 4.8–10.8)

## 2021-03-01 PROCEDURE — 99232 SBSQ HOSP IP/OBS MODERATE 35: CPT

## 2021-03-01 RX ADMIN — HEPARIN SODIUM 5000 UNIT(S): 5000 INJECTION INTRAVENOUS; SUBCUTANEOUS at 12:01

## 2021-03-01 RX ADMIN — TAMSULOSIN HYDROCHLORIDE 0.4 MILLIGRAM(S): 0.4 CAPSULE ORAL at 20:51

## 2021-03-01 RX ADMIN — AMLODIPINE BESYLATE 5 MILLIGRAM(S): 2.5 TABLET ORAL at 08:13

## 2021-03-01 RX ADMIN — HEPARIN SODIUM 5000 UNIT(S): 5000 INJECTION INTRAVENOUS; SUBCUTANEOUS at 06:30

## 2021-03-01 RX ADMIN — CHLORHEXIDINE GLUCONATE 1 APPLICATION(S): 213 SOLUTION TOPICAL at 06:29

## 2021-03-01 RX ADMIN — HEPARIN SODIUM 5000 UNIT(S): 5000 INJECTION INTRAVENOUS; SUBCUTANEOUS at 20:51

## 2021-03-01 RX ADMIN — Medication 1 APPLICATION(S): at 12:02

## 2021-03-01 RX ADMIN — Medication 1 APPLICATION(S): at 16:58

## 2021-03-01 RX ADMIN — CEFTRIAXONE 100 MILLIGRAM(S): 500 INJECTION, POWDER, FOR SOLUTION INTRAMUSCULAR; INTRAVENOUS at 16:58

## 2021-03-01 RX ADMIN — FINASTERIDE 5 MILLIGRAM(S): 5 TABLET, FILM COATED ORAL at 12:01

## 2021-03-01 RX ADMIN — Medication 1 MILLIGRAM(S): at 12:01

## 2021-03-01 RX ADMIN — Medication 1 APPLICATION(S): at 08:14

## 2021-03-01 NOTE — DIETITIAN NUTRITION RISK NOTIFICATION - ADDITIONAL COMMENTS/DIETITIAN RECOMMENDATIONS
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     Recommendations:   1. Add Ensure Enlive BID (chocolate)   2. Add Ensure pudding BID   3. Add pro-source gelatin q24h   4. add daily MVI if medically feasible   5. provide max encouragement and assistance w/ feeds. 1:1  6. cont w/ SLP recommended diet     Goal: pt to consume/tolerate ~/>50% of meals/snacks and po supplements in 3 days.

## 2021-03-01 NOTE — PROGRESS NOTE ADULT - ASSESSMENT
E. Coli sepsis - patient with confirmed sepsis on admission now resolved,   secondary to UTI  malnutrition  NIYA - resolving  dementia - today awake, alert and oriented       Plan:  patient has completed 7 days IV antibiotics  can switch to po  can go to [facility] - covid swab done

## 2021-03-01 NOTE — CHART NOTE - NSCHARTNOTEFT_GEN_A_CORE
Registered Dietitian Follow-Up     Patient Profile Reviewed                           Yes [x]   No []     Nutrition History Previously Obtained        Yes []  No [x]    spoke w/ daughter kristin ([phone]) -- reported po/appetite has been poor since 2 months. Pt ate ~25% and also was pocketing foods. says she thinks he had an issue swallowing/chewing the food. says she doesn't know the exact reason for pt's poor appetite, but he has just been "deteriorating both mentally and physically." UBW: 75kg vs. wt at admit: 55.2kg -- % wt change: 26.4% in 2 months. NKFA. Takes multivitamins, had a hard time swallowing them as well. No cul/rel food rest. Food prefs provided by daughter: jello, soups -- will convey to kitchen.     Pertinent Subjective Information:  EMR Po documentation: 2/26: 10-30%, 2/27: 45%, 2/28: 20-25%.       Pertinent Medical Interventions:  # sepsis, bacteremia UTI, improving   # Leukocytosis likely 2/2 sepsis, r/o AML/CML  # NIYA, hypernatremia, dehydration- improved cr and sodium  # dried lips and poss ulceration; wt loss; poor PO intake FTT; r/o underlying malignancy   # diarrhea- improved- if has more loose bm, send cdiff  #dysphagia 1 w/ thin liquids per SLP 2/26    Diet order: Diet, Dysphagia 1 Pureed-Thin Liquids:      Qty per Day:  PATIENT LIKES     Qty per Day:  CHOCOLATE PLEASE     Qty per Day:  1:1 SUPERVISED FEEDS  Supplement Feeding Modality:  Oral  Ensure Enlive Servings Per Day:  1       Frequency:  Three Times a day  Ensure Pudding Cans or Servings Per Day:  1       Frequency:  Three Times a day (02-26-21 @ 15:46) [Active]    Anthropometrics:  Height (cm): 170.2 (02-25-21 @ 11:29)  Weight (kg): 55.2 (02-24-21 @ 00:51) -- no other wts in EMR   BMI (kg/m2): 19.1 (02-25-21 @ 11:29)  IBW: 67.2kg     MEDICATIONS  (STANDING):  amLODIPine   Tablet 5 milliGRAM(s) Oral <User Schedule>  cefTRIAXone   IVPB 2000 milliGRAM(s) IV Intermittent every 24 hours  finasteride 5 milliGRAM(s) Oral daily  folic acid 1 milliGRAM(s) Oral daily  heparin   Injectable 5000 Unit(s) SubCutaneous every 8 hours  tamsulosin 0.4 milliGRAM(s) Oral at bedtime    Pertinent Labs: 03-01 @ 05:31: Na 143, BUN 25<H>, Cr 1.1, <H>, K+ 3.9, Phos 2.9, Mg 1.9, Alk Phos 93, ALT/SGPT 44<H>, AST/SGOT 45<H>, HbA1c --    Physical Findings:  - Appearance: confused; no edema noted; noted temple wasting per observation as pt unable to give consent   - GI function: WDL, LBM 2/26 per RN flowsheets   - Tubes: n/a   - Oral/Mouth cavity: diet consistency per SLP   - Skin: bruised/ecchymotic per RN flowsheets      Nutrition Requirements: adjusted d/t severe PCM   Weight Used: ABW 55.2kg     Estimated Energy Needs    Continue []  Adjust [x]  1412-1648kcal (MSJ 1177 -- X1.2-1.4 AF -- severe PCM + age considered)      Estimated Protein Needs    Continue []  Adjust [x]  66-77g/kg (1.2-1.4g/kg -- same reason as above; renal labs now wnl, will cont to monitor)     Estimated Fluid Needs        Continue [x]  Adjust [x]  1mL/kcal or LIP     Nutrient Intake: 10-45% -- not meeting needs      [] Previous Nutrition Diagnosis:  inadequate protein energy intake.             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      Nutrition Intervention: meals and snacks,medical food supplements     Goal/Expected Outcome:  pt to consume/tolerate ~/>50% of meals/snacks and po supplements in 3 days.     Indicator/Monitoring: diet order,energy intake,body composition,NFPF, renal/electrolyte profile    Recommendation:   1. Add Ensure Enlive BID (chocolate)   2. Add Ensure pudding BID   3. Add pro-source gelatin q24h   4. add daily MVI if medically feasible   5. provide max encouragement and assistance w/ feeds. 1:1  6. cont w/ SLP recommended diet     [phone]  pt @ [facility], RD to f/u in 3 days  RD remains available: Raquel Dupont [phone]

## 2021-03-01 NOTE — DIETITIAN NUTRITION RISK NOTIFICATION - TREATMENT: THE FOLLOWING DIET HAS BEEN RECOMMENDED
Diet, Dysphagia 1 Pureed-Thin Liquids:      Qty per Day:  PATIENT LIKES     Qty per Day:  CHOCOLATE PLEASE     Qty per Day:  1:1 SUPERVISED FEEDS  Supplement Feeding Modality:  Oral  Ensure Enlive Servings Per Day:  1       Frequency:  Three Times a day  Ensure Pudding Cans or Servings Per Day:  1       Frequency:  Three Times a day (02-26-21 @ 15:46) [Active]

## 2021-03-01 NOTE — PROGRESS NOTE ADULT - SUBJECTIVE AND OBJECTIVE BOX
today patient awake and alert and oriented x 3.  denies pain, fever, chills, back pain, dysuria  Vital Signs Last 24 Hrs  T(C): 37.1 (01 Mar 2021 06:18), Max: 37.1 (28 Feb 2021 21:19)  T(F): 98.7 (01 Mar 2021 06:18), Max: 98.7 (28 Feb 2021 21:19)  HR: 70 (01 Mar 2021 06:18) (69 - 70)  BP: 160/70 (01 Mar 2021 06:18) (160/70 - 165/72)  RR: 18 (01 Mar 2021 06:18) (18 - 18)  SpO2: 95% (01 Mar 2021 06:18) (95% - 95%)      conj pink, no jaundice  neck supple no JVD  lungs clear to auscultation  heart regular rhythm and rate. no murmur or gallops heard  abd. soft nontender bs Pos.  extremities no edema, no cyanosis  labs:                    11.0   12.07 )-----------( 210      ( 01 Mar 2021 05:31 )             33.8   03-01    143  |  106  |  25<H>  ----------------------------<  133<H>  3.9   |  26  |  1.1    Ca    7.9<L>      01 Mar 2021 05:31  Phos  2.9     03-01  Mg     1.9     03-01    TPro  5.4<L>  /  Alb  2.7<L>  /  TBili  0.3  /  DBili  x   /  AST  45<H>  /  ALT  44<H>  /  AlkPhos  93  03-01

## 2021-03-02 ENCOUNTER — TRANSCRIPTION ENCOUNTER (OUTPATIENT)
Age: 86
End: 2021-03-02

## 2021-03-02 LAB
APPEARANCE UR: CLEAR — SIGNIFICANT CHANGE UP
BACTERIA # UR AUTO: NEGATIVE — SIGNIFICANT CHANGE UP
BILIRUB UR-MCNC: NEGATIVE — SIGNIFICANT CHANGE UP
COLOR SPEC: YELLOW — SIGNIFICANT CHANGE UP
CULTURE RESULTS: SIGNIFICANT CHANGE UP
CULTURE RESULTS: SIGNIFICANT CHANGE UP
DIFF PNL FLD: NEGATIVE — SIGNIFICANT CHANGE UP
EPI CELLS # UR: 2 /HPF — SIGNIFICANT CHANGE UP (ref 0–5)
GLUCOSE UR QL: NEGATIVE — SIGNIFICANT CHANGE UP
HYALINE CASTS # UR AUTO: 2 /LPF — SIGNIFICANT CHANGE UP (ref 0–7)
KETONES UR-MCNC: NEGATIVE — SIGNIFICANT CHANGE UP
LEUKOCYTE ESTERASE UR-ACNC: NEGATIVE — SIGNIFICANT CHANGE UP
NITRITE UR-MCNC: NEGATIVE — SIGNIFICANT CHANGE UP
PH UR: 6.5 — SIGNIFICANT CHANGE UP (ref 5–8)
PROT UR-MCNC: ABNORMAL
RBC CASTS # UR COMP ASSIST: 6 /HPF — HIGH (ref 0–4)
SP GR SPEC: 1.02 — SIGNIFICANT CHANGE UP (ref 1.01–1.03)
SPECIMEN SOURCE: SIGNIFICANT CHANGE UP
SPECIMEN SOURCE: SIGNIFICANT CHANGE UP
UROBILINOGEN FLD QL: SIGNIFICANT CHANGE UP
WBC UR QL: 4 /HPF — SIGNIFICANT CHANGE UP (ref 0–5)

## 2021-03-02 PROCEDURE — 99232 SBSQ HOSP IP/OBS MODERATE 35: CPT

## 2021-03-02 RX ORDER — TAMSULOSIN HYDROCHLORIDE 0.4 MG/1
1 CAPSULE ORAL
Qty: 0 | Refills: 0 | DISCHARGE
Start: 2021-03-02

## 2021-03-02 RX ORDER — AMLODIPINE BESYLATE 2.5 MG/1
1 TABLET ORAL
Qty: 0 | Refills: 0 | DISCHARGE
Start: 2021-03-02

## 2021-03-02 RX ORDER — FOLIC ACID 0.8 MG
1 TABLET ORAL
Qty: 0 | Refills: 0 | DISCHARGE
Start: 2021-03-02

## 2021-03-02 RX ORDER — HEPARIN SODIUM 5000 [USP'U]/ML
5000 INJECTION INTRAVENOUS; SUBCUTANEOUS
Qty: 0 | Refills: 0 | DISCHARGE
Start: 2021-03-02

## 2021-03-02 RX ADMIN — HEPARIN SODIUM 5000 UNIT(S): 5000 INJECTION INTRAVENOUS; SUBCUTANEOUS at 12:33

## 2021-03-02 RX ADMIN — TAMSULOSIN HYDROCHLORIDE 0.4 MILLIGRAM(S): 0.4 CAPSULE ORAL at 21:10

## 2021-03-02 RX ADMIN — Medication 1 MILLIGRAM(S): at 11:09

## 2021-03-02 RX ADMIN — Medication 1 APPLICATION(S): at 07:44

## 2021-03-02 RX ADMIN — HEPARIN SODIUM 5000 UNIT(S): 5000 INJECTION INTRAVENOUS; SUBCUTANEOUS at 21:10

## 2021-03-02 RX ADMIN — FINASTERIDE 5 MILLIGRAM(S): 5 TABLET, FILM COATED ORAL at 11:09

## 2021-03-02 RX ADMIN — HEPARIN SODIUM 5000 UNIT(S): 5000 INJECTION INTRAVENOUS; SUBCUTANEOUS at 05:41

## 2021-03-02 RX ADMIN — Medication 1 APPLICATION(S): at 16:56

## 2021-03-02 RX ADMIN — Medication 1 APPLICATION(S): at 12:34

## 2021-03-02 RX ADMIN — CHLORHEXIDINE GLUCONATE 1 APPLICATION(S): 213 SOLUTION TOPICAL at 05:41

## 2021-03-02 RX ADMIN — AMLODIPINE BESYLATE 5 MILLIGRAM(S): 2.5 TABLET ORAL at 07:44

## 2021-03-02 RX ADMIN — CEFTRIAXONE 100 MILLIGRAM(S): 500 INJECTION, POWDER, FOR SOLUTION INTRAMUSCULAR; INTRAVENOUS at 16:55

## 2021-03-02 NOTE — DISCHARGE NOTE PROVIDER - DETAILS OF MALNUTRITION DIAGNOSIS/DIAGNOSES
This patient has been assessed with a concern for Malnutrition and was treated during this hospitalization for the following Nutrition diagnosis/diagnoses:     -  03/01/2021: Severe protein-calorie malnutrition   This patient has been assessed with a concern for Malnutrition and was treated during this hospitalization for the following Nutrition diagnosis/diagnoses:     -  03/01/2021: Severe protein-calorie malnutrition    This patient has been assessed with a concern for Malnutrition and was treated during this hospitalization for the following Nutrition diagnosis/diagnoses:     -  03/01/2021: Severe protein-calorie malnutrition

## 2021-03-02 NOTE — PROGRESS NOTE ADULT - ASSESSMENT
E. Coli sepsis - patient with confirmed sepsis on admission now resolved,   secondary to UTI  malnutrition  NIYA - resolving  dementia - today awake, alert and oriented       Plan:  patient to complete 7 days IV antibiotics  then switch to po  can go to [facility] - covid swab done  anticipate DC to [facility] tomorrow

## 2021-03-02 NOTE — DISCHARGE NOTE PROVIDER - NSDCCPCAREPLAN_GEN_ALL_CORE_FT
PRINCIPAL DISCHARGE DIAGNOSIS  Diagnosis: UTI (urinary tract infection)  Assessment and Plan of Treatment: resolved  follow up with PMD within 2 weeks      SECONDARY DISCHARGE DIAGNOSES  Diagnosis: Leukocytosis  Assessment and Plan of Treatment: resolved.   hem/onc work up complete  follow up with Dr Buckley on 3/16 at 1:15 pm    Diagnosis: NIYA (acute kidney injury)  Assessment and Plan of Treatment: resolved

## 2021-03-02 NOTE — DISCHARGE NOTE PROVIDER - HOSPITAL COURSE
HPI:  89y M PMD BPH presents for fever. Pt developed fever this week with associated n/v, relieved with Tylenol, no aggravating factors and difficulty urinating. While inpatient, patient was found to have NIYA with positive UA and  urine culture showing more than 100,000 colonies of E.coli. Pt was treated with Rocephin. Initial blood cultures showed E. coli but two sets of repeat blood cultures were negative and fevers resolved. Repeat UA was negative and renal ultrasound was negative. Abdominal ultrasound  (02.26.21 @ 17:45) showed cholelithiasis. Infectious disease following. Patient was seen by hem/onc for elevated WBC of 56. 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 Flow cytometry BCR- ABL not detected. Pt scheduled to follow up with hem/onc Dr Buckley on 3/16 at 1:15 pm. Pt stable and medically cleared for discharge.

## 2021-03-02 NOTE — DISCHARGE NOTE PROVIDER - CARE PROVIDER_API CALL
Jorge Dia (DO)  Hematology; Internal Medicine; Medical Oncology  [address] [place] [address]  Phone: [phone]  Fax: [phone]  Follow Up Time:

## 2021-03-02 NOTE — DISCHARGE NOTE PROVIDER - NSDCFUADDINST_GEN_ALL_CORE_FT
****THE PATIENT IS DUE FOR HIS 2ND MODERNA COVID VACCINE ON SAT MARCH 13, 2021; PLEASE ARRANGE FOR THE VACCINE TO BE ADMINISTERED**** THANK YOU!!

## 2021-03-02 NOTE — DISCHARGE NOTE PROVIDER - NSDCMRMEDTOKEN_GEN_ALL_CORE_FT
finasteride 1 mg oral tablet: 1 tab(s) orally once a day   amLODIPine 5 mg oral tablet: 1 tab(s) orally   finasteride 1 mg oral tablet: 1 tab(s) orally once a day  folic acid 1 mg oral tablet: 1 tab(s) orally once a day  tamsulosin 0.4 mg oral capsule: 1 cap(s) orally once a day (at bedtime)  vitamin A &amp; D topical ointment: Apply topically to chapped lips 3 times a day: after breakfast, after lunch, and at bedtime

## 2021-03-02 NOTE — PROGRESS NOTE ADULT - SUBJECTIVE AND OBJECTIVE BOX
today patient awake and alert and oriented x 3.  denies pain, fever, chills, back pain, dysuria  Vital Signs Last 24 Hrs  T(C): 36.2 (02 Mar 2021 11:51), Max: 37.1 (01 Mar 2021 20:52)  T(F): 97.1 (02 Mar 2021 11:51), Max: 98.8 (01 Mar 2021 20:52)  HR: 86 (02 Mar 2021 11:51) (65 - 86)  BP: 137/57 (02 Mar 2021 11:51) (132/63 - 167/74)  RR: 18 (02 Mar 2021 11:51) (18 - 18)  conj pink, no jaundice  neck supple no JVD  lungs clear to auscultation  heart regular rhythm and rate. no murmur or gallops heard  abd. soft nontender bs Pos.  extremities no edema, no cyanosis  labs:                          11.0   12.07 )-----------( 210      ( 01 Mar 2021 05:31 )             33.8   03-01    143  |  106  |  25<H>  ----------------------------<  133<H>  3.9   |  26  |  1.1    Ca    7.9<L>      01 Mar 2021 05:31  Phos  2.9     03-01  Mg     1.9     03-01    TPro  5.4<L>  /  Alb  2.7<L>  /  TBili  0.3  /  DBili  x   /  AST  45<H>  /  ALT  44<H>  /  AlkPhos  93  03-01

## 2021-03-03 LAB
ALBUMIN SERPL ELPH-MCNC: 3 G/DL — LOW (ref 3.5–5.2)
ALP SERPL-CCNC: 94 U/L — SIGNIFICANT CHANGE UP (ref 30–115)
ALT FLD-CCNC: 39 U/L — SIGNIFICANT CHANGE UP (ref 0–41)
ANION GAP SERPL CALC-SCNC: 11 MMOL/L — SIGNIFICANT CHANGE UP (ref 7–14)
AST SERPL-CCNC: 32 U/L — SIGNIFICANT CHANGE UP (ref 0–41)
BASOPHILS # BLD AUTO: 0.05 K/UL — SIGNIFICANT CHANGE UP (ref 0–0.2)
BASOPHILS NFR BLD AUTO: 0.4 % — SIGNIFICANT CHANGE UP (ref 0–1)
BILIRUB SERPL-MCNC: 0.3 MG/DL — SIGNIFICANT CHANGE UP (ref 0.2–1.2)
BUN SERPL-MCNC: 28 MG/DL — HIGH (ref 10–20)
CALCIUM SERPL-MCNC: 8.3 MG/DL — LOW (ref 8.5–10.1)
CHLORIDE SERPL-SCNC: 106 MMOL/L — SIGNIFICANT CHANGE UP (ref 98–110)
CO2 SERPL-SCNC: 27 MMOL/L — SIGNIFICANT CHANGE UP (ref 17–32)
CREAT SERPL-MCNC: 1.1 MG/DL — SIGNIFICANT CHANGE UP (ref 0.7–1.5)
EOSINOPHIL # BLD AUTO: 0.31 K/UL — SIGNIFICANT CHANGE UP (ref 0–0.7)
EOSINOPHIL NFR BLD AUTO: 2.6 % — SIGNIFICANT CHANGE UP (ref 0–8)
GLUCOSE SERPL-MCNC: 115 MG/DL — HIGH (ref 70–99)
HCT VFR BLD CALC: 36.3 % — LOW (ref 42–52)
HGB BLD-MCNC: 11.2 G/DL — LOW (ref 14–18)
IMM GRANULOCYTES NFR BLD AUTO: 1.3 % — HIGH (ref 0.1–0.3)
LYMPHOCYTES # BLD AUTO: 1.51 K/UL — SIGNIFICANT CHANGE UP (ref 1.2–3.4)
LYMPHOCYTES # BLD AUTO: 12.7 % — LOW (ref 20.5–51.1)
MAGNESIUM SERPL-MCNC: 1.9 MG/DL — SIGNIFICANT CHANGE UP (ref 1.8–2.4)
MCHC RBC-ENTMCNC: 30.5 PG — SIGNIFICANT CHANGE UP (ref 27–31)
MCHC RBC-ENTMCNC: 30.9 G/DL — LOW (ref 32–37)
MCV RBC AUTO: 98.9 FL — HIGH (ref 80–94)
MONOCYTES # BLD AUTO: 1.02 K/UL — HIGH (ref 0.1–0.6)
MONOCYTES NFR BLD AUTO: 8.6 % — SIGNIFICANT CHANGE UP (ref 1.7–9.3)
NEUTROPHILS # BLD AUTO: 8.84 K/UL — HIGH (ref 1.4–6.5)
NEUTROPHILS NFR BLD AUTO: 74.4 % — SIGNIFICANT CHANGE UP (ref 42.2–75.2)
NRBC # BLD: 0 /100 WBCS — SIGNIFICANT CHANGE UP (ref 0–0)
PLATELET # BLD AUTO: 324 K/UL — SIGNIFICANT CHANGE UP (ref 130–400)
POTASSIUM SERPL-MCNC: 5.1 MMOL/L — HIGH (ref 3.5–5)
POTASSIUM SERPL-SCNC: 5.1 MMOL/L — HIGH (ref 3.5–5)
PROT SERPL-MCNC: 6 G/DL — SIGNIFICANT CHANGE UP (ref 6–8)
RBC # BLD: 3.67 M/UL — LOW (ref 4.7–6.1)
RBC # FLD: 12.1 % — SIGNIFICANT CHANGE UP (ref 11.5–14.5)
SARS-COV-2 RNA SPEC QL NAA+PROBE: SIGNIFICANT CHANGE UP
SODIUM SERPL-SCNC: 144 MMOL/L — SIGNIFICANT CHANGE UP (ref 135–146)
WBC # BLD: 11.89 K/UL — HIGH (ref 4.8–10.8)
WBC # FLD AUTO: 11.89 K/UL — HIGH (ref 4.8–10.8)

## 2021-03-03 PROCEDURE — 99238 HOSP IP/OBS DSCHRG MGMT 30/<: CPT

## 2021-03-03 RX ADMIN — CEFTRIAXONE 100 MILLIGRAM(S): 500 INJECTION, POWDER, FOR SOLUTION INTRAMUSCULAR; INTRAVENOUS at 17:57

## 2021-03-03 RX ADMIN — Medication 1 APPLICATION(S): at 07:56

## 2021-03-03 RX ADMIN — TAMSULOSIN HYDROCHLORIDE 0.4 MILLIGRAM(S): 0.4 CAPSULE ORAL at 21:30

## 2021-03-03 RX ADMIN — Medication 1 APPLICATION(S): at 20:07

## 2021-03-03 RX ADMIN — FINASTERIDE 5 MILLIGRAM(S): 5 TABLET, FILM COATED ORAL at 12:19

## 2021-03-03 RX ADMIN — Medication 1 MILLIGRAM(S): at 12:19

## 2021-03-03 RX ADMIN — HEPARIN SODIUM 5000 UNIT(S): 5000 INJECTION INTRAVENOUS; SUBCUTANEOUS at 21:30

## 2021-03-03 RX ADMIN — Medication 1 APPLICATION(S): at 13:15

## 2021-03-03 RX ADMIN — HEPARIN SODIUM 5000 UNIT(S): 5000 INJECTION INTRAVENOUS; SUBCUTANEOUS at 05:53

## 2021-03-03 RX ADMIN — AMLODIPINE BESYLATE 5 MILLIGRAM(S): 2.5 TABLET ORAL at 07:55

## 2021-03-03 RX ADMIN — HEPARIN SODIUM 5000 UNIT(S): 5000 INJECTION INTRAVENOUS; SUBCUTANEOUS at 13:15

## 2021-03-03 RX ADMIN — CHLORHEXIDINE GLUCONATE 1 APPLICATION(S): 213 SOLUTION TOPICAL at 05:52

## 2021-03-03 NOTE — PROGRESS NOTE ADULT - ASSESSMENT
UTI  Patient presented with abdominal pain, n/v, weakness  UA: UTI  Leukocytosis trending down   Completed antibiotics   D/C Home  Pending placement     NIYA  Resolved    BPH  Continue Flomax  F/U Outpatient with Urology

## 2021-03-03 NOTE — PROGRESS NOTE ADULT - SUBJECTIVE AND OBJECTIVE BOX
89y M PMD BPH presents for eval of fever. Pt developed fever this week with associated n/v, relieved with Tylenol, no aggravating factors along with weakness and weight loss and rectal bleeding. Vitals were stable. Labs were remarkable for Leukocytosis trending down, NIYA that is resolved, Hypokalemia and Hyperkalemia. Patient was admitted for Sepsis due to ECLI UTI, Patient is stable to be discharged and pending placement. H/H has been stable.     Patient was seen at the bedside, not in acute distress.     Constitutional: well-developed; well-groomed; well-nourished  Eyes :conjunctiva clear   Neck supple; no JVD  Back normal shape; ROM intact  Respiratory normal; airway patent; breath sounds equal; good air movement  Cardiovascular; regular rate and rhythm  no rub  no murmur  normal PMI  Gastrointestinal :soft, Nontender, No distension, NBS  Neurological: alert and oriented x 3; responds to pain; responds to verbal commands; sensation intact; deep reflexes intact  Skin; warm and dry; color normal  Musculoskeletal ::normal; ROM intact; no joint swelling; no joint erythema; no joint warmth

## 2021-03-04 ENCOUNTER — TRANSCRIPTION ENCOUNTER (OUTPATIENT)
Age: 86
End: 2021-03-04

## 2021-03-04 VITALS
HEART RATE: 68 BPM | DIASTOLIC BLOOD PRESSURE: 66 MMHG | TEMPERATURE: 99 F | RESPIRATION RATE: 18 BRPM | SYSTOLIC BLOOD PRESSURE: 141 MMHG

## 2021-03-04 PROCEDURE — 99232 SBSQ HOSP IP/OBS MODERATE 35: CPT

## 2021-03-04 RX ORDER — AMOXICILLIN 250 MG/5ML
1 SUSPENSION, RECONSTITUTED, ORAL (ML) ORAL
Qty: 0 | Refills: 0 | DISCHARGE

## 2021-03-04 RX ORDER — AMOXICILLIN 250 MG/5ML
500 SUSPENSION, RECONSTITUTED, ORAL (ML) ORAL
Refills: 0 | Status: DISCONTINUED | OUTPATIENT
Start: 2021-03-04 | End: 2021-03-04

## 2021-03-04 RX ADMIN — Medication 1 MILLIGRAM(S): at 11:17

## 2021-03-04 RX ADMIN — HEPARIN SODIUM 5000 UNIT(S): 5000 INJECTION INTRAVENOUS; SUBCUTANEOUS at 05:16

## 2021-03-04 RX ADMIN — HEPARIN SODIUM 5000 UNIT(S): 5000 INJECTION INTRAVENOUS; SUBCUTANEOUS at 13:02

## 2021-03-04 RX ADMIN — FINASTERIDE 5 MILLIGRAM(S): 5 TABLET, FILM COATED ORAL at 11:17

## 2021-03-04 RX ADMIN — Medication 1 APPLICATION(S): at 08:00

## 2021-03-04 RX ADMIN — Medication 1 APPLICATION(S): at 13:02

## 2021-03-04 RX ADMIN — AMLODIPINE BESYLATE 5 MILLIGRAM(S): 2.5 TABLET ORAL at 08:00

## 2021-03-04 RX ADMIN — CHLORHEXIDINE GLUCONATE 1 APPLICATION(S): 213 SOLUTION TOPICAL at 05:04

## 2021-03-04 NOTE — CHART NOTE - NSCHARTNOTESELECT_GEN_ALL_CORE
Event Note
RD @ risk f/u/Event Note
@ [facility] f/u/Event Note
Event Note
Event Note
Hospitalist/Event Note

## 2021-03-04 NOTE — CHART NOTE - NSCHARTNOTEFT_GEN_A_CORE
Registered Dietitian Follow-Up     Patient Profile Reviewed                           Yes [x]   No []     Nutrition History Previously Obtained        Yes [x]  No []       Pertinent Subjective Information: spoke to pt's family friend at bedside and RN -- RN reported pt ate better than yesterday, yesterday he was just tired, but today did better, however not sure how much of meal tray he finished. family at bedside reported that pt isn't eating too well, however, does like the ensure and drinks them.      Pertinent Medical Interventions:  #UTI -- Leukocytosis trending down   #NIYA -- resolved     Diet order: Diet, Dysphagia 1 Pureed-Thin Liquids:      Qty per Day:  1:1 feeds     Qty per Day:  Chocolate flavor  Prosource Gelatein Plus     Qty per Day:  q24h  Supplement Feeding Modality:  Oral  Ensure Enlive Servings Per Day:  1       Frequency:  Two Times a day  Ensure Pudding Cans or Servings Per Day:  1       Frequency:  Daily (03-01-21 @ 11:58) [Active]    Anthropometrics:  Height (cm): 170.2 (02-25-21 @ 11:29)  Weight (kg): 55.2 (02-24-21 @ 00:51) -- no other wts in EMR   BMI (kg/m2): 19.1 (02-25-21 @ 11:29)  IBW: 67.2kg     MEDICATIONS  (STANDING):  amLODIPine   Tablet 5 milliGRAM(s) Oral <User Schedule>  finasteride 5 milliGRAM(s) Oral daily  folic acid 1 milliGRAM(s) Oral daily  heparin   Injectable 5000 Unit(s) SubCutaneous every 8 hours  tamsulosin 0.4 milliGRAM(s) Oral at bedtime    Pertinent Labs: (3/3) H/H: 11.2/36.3, K: 5.1, BUN: 28, glucose: 115, eGFR: 59    Physical Findings:  - Appearance: Confused; no edema noted per RN flowsheets   - GI function: RN unaware of recent BM, soft non-tender per RN flowsheets, LBM 2/26   - Tubes: n/a   - Oral/Mouth cavity: tolerating dysphagia 1 + thin liquids   - Skin: redness/blanchable, L elbow scab per RN flowsheets      Nutrition Requirements: adjusted d/t severe PCM   Weight Used: ABW 55.2kg     Estimated Energy Needs    Continue []  Adjust [x]  1412-1648kcal (MSJ 1177 -- X1.2-1.4 AF -- severe PCM + age considered)      Estimated Protein Needs    Continue []  Adjust [x]  66-77g/kg (1.2-1.4g/kg -- same reason as above; renal labs now wnl, will cont to monitor)     Estimated Fluid Needs        Continue [x]  Adjust [x]  1mL/kcal or LIP     Nutrient Intake: family says pt drinking ensure supplements + RN reported pt ate better today -- will cont to monitor      [] Previous Nutrition Diagnosis:  inadequate protein energy intake,  severe PCM                [x] Ongoing          [] Resolved     Nutrition Intervention: meals and snacks, coordination of care      Goal/Expected Outcome:  pt to consume/tolerate ~/>50% of meals/snacks and po supplements in 3 days.     Indicator/Monitoring:  diet order,energy intake,body composition,NFPF, renal/electrolyte/glucose profile    Recommendation:   1. cont w/ current diet order + supplements   2. Add daily MVI if medically feasible   3. prouragement and assistance w/ feeds. 1:1  4. cont w/ SLP recommended diet   5. Add appetite stimulant if medically feasible     x12n 3 days  RD remains Registered Dietitian Follow-Up     Patient Profile Reviewed                           Yes [x]   No []     Nutrition History Previously Obtained        Yes [x]  No []       Pertinent Subjective Information: spoke to pt's family friend at bedside and RN -- RN reported pt ate better than yesterday, yesterday he was just tired, but today did better, however not sure how much of meal tray he finished. family at bedside reported that pt isn't eating too well, however, does like the ensure and drinks them.      Pertinent Medical Interventions:  #UTI -- Leukocytosis trending down   #NIYA -- resolved     Diet order: Diet, Dysphagia 1 Pureed-Thin Liquids:      Qty per Day:  1:1 feeds     Qty per Day:  Chocolate flavor  Prosource Gelatein Plus     Qty per Day:  q24h  Supplement Feeding Modality:  Oral  Ensure Enlive Servings Per Day:  1       Frequency:  Two Times a day  Ensure Pudding Cans or Servings Per Day:  1       Frequency:  Daily (03-01-21 @ 11:58) [Active]    Anthropometrics:  Height (cm): 170.2 (02-25-21 @ 11:29)  Weight (kg): 55.2 (02-24-21 @ 00:51) -- no other wts in EMR   BMI (kg/m2): 19.1 (02-25-21 @ 11:29)  IBW: 67.2kg     MEDICATIONS  (STANDING):  amLODIPine   Tablet 5 milliGRAM(s) Oral <User Schedule>  finasteride 5 milliGRAM(s) Oral daily  folic acid 1 milliGRAM(s) Oral daily  heparin   Injectable 5000 Unit(s) SubCutaneous every 8 hours  tamsulosin 0.4 milliGRAM(s) Oral at bedtime    Pertinent Labs: (3/3) H/H: 11.2/36.3, K: 5.1, BUN: 28, glucose: 115, eGFR: 59    Physical Findings:  - Appearance: Confused; no edema noted per RN flowsheets   - GI function: RN unaware of recent BM, soft non-tender per RN flowsheets, LBM 2/26   - Tubes: n/a   - Oral/Mouth cavity: tolerating dysphagia 1 + thin liquids   - Skin: redness/blanchable, L elbow scab per RN flowsheets      Nutrition Requirements: adjusted d/t severe PCM   Weight Used: ABW 55.2kg     Estimated Energy Needs    Continue []  Adjust [x]  1412-1648kcal (MSJ 1177 -- X1.2-1.4 AF -- severe PCM + age considered)      Estimated Protein Needs    Continue []  Adjust [x]  66-77g/kg (1.2-1.4g/kg -- same reason as above; renal labs now wnl, will cont to monitor)     Estimated Fluid Needs        Continue [x]  Adjust [x]  1mL/kcal or LIP     Nutrient Intake: family says pt drinking ensure supplements + RN reported pt ate better today -- will cont to monitor      [] Previous Nutrition Diagnosis:  inadequate protein energy intake,  severe PCM                [x] Ongoing          [] Resolved     Nutrition Intervention: meals and snacks, coordination of care      Goal/Expected Outcome:  pt to consume/tolerate ~/>50% of meals/snacks and po supplements in 4 days.     Indicator/Monitoring:  diet order,energy intake,body composition,NFPF, renal/electrolyte/glucose profile    Recommendation:   1. cont w/ current diet order + supplements   2. Add daily MVI if medically feasible   3. provide max encouragement w/ meals and assistance w/ (feeds. 1:1)  4. cont w/ SLP recommended diet   5. Add appetite stimulant if medically feasible     [id number]  pt @ [facility], RD to f/u in 4 days   RD remains available: Raquel Dupont [phone]

## 2021-03-04 NOTE — PROGRESS NOTE ADULT - NUTRITIONAL ASSESSMENT
This patient has been assessed with a concern for Malnutrition and has been determined to have a diagnosis/diagnoses of Severe protein-calorie malnutrition.    This patient is being managed with:   Diet Dysphagia 1 Pureed-Thin Liquids-     Qty per Day:  1:1 feeds     Qty per Day:  Chocolate flavor  Prosource Gelatein Plus     Qty per Day:  q24h  Supplement Feeding Modality:  Oral  Ensure Enlive Servings Per Day:  1       Frequency:  Two Times a day  Ensure Pudding Cans or Servings Per Day:  1       Frequency:  Daily  Entered: Mar  1 2021 11:58AM    
This patient has been assessed with a concern for Malnutrition and has been determined to have a diagnosis/diagnoses of Severe protein-calorie malnutrition.    This patient is being managed with:   Diet Dysphagia 1 Pureed-Thin Liquids-     Qty per Day:  1:1 feeds     Qty per Day:  Chocolate flavor  Prosource Gelatein Plus     Qty per Day:  q24h  Supplement Feeding Modality:  Oral  Ensure Enlive Servings Per Day:  1       Frequency:  Two Times a day  Ensure Pudding Cans or Servings Per Day:  1       Frequency:  Daily  Entered: Mar  1 2021 11:58AM    Diet Dysphagia 1 Pureed-Thin Liquids-     Qty per Day:  PATIENT LIKES     Qty per Day:  CHOCOLATE PLEASE     Qty per Day:  1:1 SUPERVISED FEEDS  Supplement Feeding Modality:  Oral  Ensure Enlive Servings Per Day:  1       Frequency:  Three Times a day  Ensure Pudding Cans or Servings Per Day:  1       Frequency:  Three Times a day  Entered: Feb 26 2021  3:45PM    The following pending diet order is being considered for treatment of Severe protein-calorie malnutrition:null

## 2021-03-04 NOTE — PROGRESS NOTE ADULT - REASON FOR ADMISSION
UTI
UTI  Reason for Continued Hospitalization: Pending placement
UTI
UTI  Reason for Continued Hospitalization: Pending placement
UTI
UTI

## 2021-03-04 NOTE — DISCHARGE NOTE NURSING/CASE MANAGEMENT/SOCIAL WORK - PATIENT PORTAL LINK FT
You can access the FollowMyHealth Patient Portal offered by [facility] by registering at the following website: [email or web address]. By joining [facility]’s FollowMyHealth portal, you will also be able to view your health information using other applications (apps) compatible with our system.

## 2021-03-05 LAB — SARS-COV-2 RNA SPEC QL NAA+PROBE: SIGNIFICANT CHANGE UP

## 2021-03-10 DIAGNOSIS — N40.1 BENIGN PROSTATIC HYPERPLASIA WITH LOWER URINARY TRACT SYMPTOMS: ICD-10-CM

## 2021-03-10 DIAGNOSIS — A41.9 SEPSIS, UNSPECIFIED ORGANISM: ICD-10-CM

## 2021-03-10 DIAGNOSIS — N17.9 ACUTE KIDNEY FAILURE, UNSPECIFIED: ICD-10-CM

## 2021-03-10 DIAGNOSIS — D63.1 ANEMIA IN CHRONIC KIDNEY DISEASE: ICD-10-CM

## 2021-03-10 DIAGNOSIS — R32 UNSPECIFIED URINARY INCONTINENCE: ICD-10-CM

## 2021-03-10 DIAGNOSIS — Z66 DO NOT RESUSCITATE: ICD-10-CM

## 2021-03-10 DIAGNOSIS — B96.20 UNSPECIFIED ESCHERICHIA COLI [E. COLI] AS THE CAUSE OF DISEASES CLASSIFIED ELSEWHERE: ICD-10-CM

## 2021-03-10 DIAGNOSIS — E43 UNSPECIFIED SEVERE PROTEIN-CALORIE MALNUTRITION: ICD-10-CM

## 2021-03-10 DIAGNOSIS — E87.5 HYPERKALEMIA: ICD-10-CM

## 2021-03-10 DIAGNOSIS — R62.7 ADULT FAILURE TO THRIVE: ICD-10-CM

## 2021-03-10 DIAGNOSIS — R74.01 ELEVATION OF LEVELS OF LIVER TRANSAMINASE LEVELS: ICD-10-CM

## 2021-03-10 DIAGNOSIS — E87.6 HYPOKALEMIA: ICD-10-CM

## 2021-03-10 DIAGNOSIS — E87.0 HYPEROSMOLALITY AND HYPERNATREMIA: ICD-10-CM

## 2021-03-10 DIAGNOSIS — E86.0 DEHYDRATION: ICD-10-CM

## 2021-03-10 DIAGNOSIS — G93.41 METABOLIC ENCEPHALOPATHY: ICD-10-CM

## 2021-03-10 DIAGNOSIS — N18.30 CHRONIC KIDNEY DISEASE, STAGE 3 UNSPECIFIED: ICD-10-CM

## 2021-03-10 DIAGNOSIS — N39.0 URINARY TRACT INFECTION, SITE NOT SPECIFIED: ICD-10-CM

## 2021-03-16 ENCOUNTER — APPOINTMENT (OUTPATIENT)
Dept: HEMATOLOGY ONCOLOGY | Facility: CLINIC | Age: 86
End: 2021-03-16

## 2021-11-18 NOTE — ED PROVIDER NOTE - INTERNATIONAL TRAVEL
No Detail Level: Zone Size Of Lesion In Cm (Optional): 0 Body Location Override (Optional - Billing Will Still Be Based On Selected Body Map Location If Applicable): upper back Detail Level: Simple Body Location Override (Optional - Billing Will Still Be Based On Selected Body Map Location If Applicable): chest

## 2022-05-31 NOTE — DISCHARGE NOTE PROVIDER - REASON FOR ADMISSION
UTI Hydroquinone Counseling:  Patient advised that medication may result in skin irritation, lightening (hypopigmentation), dryness, and burning.  In the event of skin irritation, the patient was advised to reduce the amount of the drug applied or use it less frequently.  Rarely, spots that are treated with hydroquinone can become darker (pseudoochronosis).  Should this occur, patient instructed to stop medication and call the office. The patient verbalized understanding of the proper use and possible adverse effects of hydroquinone.  All of the patient's questions and concerns were addressed.

## 2023-01-26 NOTE — PROGRESS NOTE ADULT - PROVIDER SPECIALTY LIST ADULT
Hospitalist
Nephrology
Heme/Onc
Hospitalist
Hospitalist
Internal Medicine
Internal Medicine
Hospitalist
Infectious Disease
Primary Defect Length In Cm (Final Defect Size - Required For Flaps/Grafts): 1.5

## 2023-08-04 NOTE — ED ADULT NURSE NOTE - DRUG PRE-SCREENING (DAST -1)
The right DP pulse is +2. The right PT pulse is +2. The right radial pulse is +2. The right femoral pulse is +2. Statement Selected

## 2024-03-14 NOTE — PHYSICAL THERAPY INITIAL EVALUATION ADULT - AMBULATION SKILLS, REHAB EVAL
I called [employer] [facility] for quantity limit. The Quantity limit was denied as it is only approved if the patient has missed two doses and is restarting therapy.    independent